# Patient Record
Sex: MALE | Race: ASIAN | NOT HISPANIC OR LATINO | Employment: OTHER | URBAN - METROPOLITAN AREA
[De-identification: names, ages, dates, MRNs, and addresses within clinical notes are randomized per-mention and may not be internally consistent; named-entity substitution may affect disease eponyms.]

---

## 2017-06-30 ENCOUNTER — OFFICE VISIT (OUTPATIENT)
Dept: URGENT CARE | Facility: URGENT CARE | Age: 47
End: 2017-06-30
Payer: COMMERCIAL

## 2017-06-30 ENCOUNTER — RADIANT APPOINTMENT (OUTPATIENT)
Dept: GENERAL RADIOLOGY | Facility: CLINIC | Age: 47
End: 2017-06-30
Attending: NURSE PRACTITIONER
Payer: COMMERCIAL

## 2017-06-30 VITALS
WEIGHT: 128 LBS | SYSTOLIC BLOOD PRESSURE: 110 MMHG | OXYGEN SATURATION: 99 % | HEART RATE: 70 BPM | TEMPERATURE: 98.5 F | DIASTOLIC BLOOD PRESSURE: 70 MMHG

## 2017-06-30 DIAGNOSIS — B02.9 HERPES ZOSTER WITHOUT COMPLICATION: ICD-10-CM

## 2017-06-30 DIAGNOSIS — M79.621 PAIN OF RIGHT UPPER ARM: ICD-10-CM

## 2017-06-30 DIAGNOSIS — M79.621 PAIN OF RIGHT UPPER ARM: Primary | ICD-10-CM

## 2017-06-30 PROCEDURE — 99203 OFFICE O/P NEW LOW 30 MIN: CPT | Performed by: NURSE PRACTITIONER

## 2017-06-30 PROCEDURE — 73060 X-RAY EXAM OF HUMERUS: CPT | Mod: RT

## 2017-06-30 RX ORDER — IBUPROFEN 600 MG/1
600 TABLET, FILM COATED ORAL EVERY 6 HOURS PRN
Qty: 30 TABLET | Refills: 1 | Status: SHIPPED | OUTPATIENT
Start: 2017-06-30 | End: 2019-11-05

## 2017-06-30 RX ORDER — VALACYCLOVIR HYDROCHLORIDE 1 G/1
1000 TABLET, FILM COATED ORAL 3 TIMES DAILY
Qty: 21 TABLET | Refills: 0 | Status: SHIPPED | OUTPATIENT
Start: 2017-06-30 | End: 2017-07-15

## 2017-06-30 NOTE — MR AVS SNAPSHOT
After Visit Summary   6/30/2017    Avtar Hodge    MRN: [id number]           Patient Information     Date Of Birth          1970        Visit Information        Provider Department      6/30/2017 8:40 PM Dariel Kraus APRN [facility] URGENT CARE        Today's Diagnoses     Pain of right upper arm    -  1    Herpes zoster without complication          Care Instructions      Shingles  Shingles is a viral infection caused by the same virus as chicken pox. Anyone who has had chicken pox may get shingles later in life. The virus stays in the body, but remains dormant (asleep). Shingles often occurs in older persons or persons with lowered immunity. But it can affect anyone at any age.  Shingles starts as a tingling patch of skin on one side of the body. Small, painful blisters may then appear. The rash does not spread to the rest of the body.  Exposure to shingles cannot cause shingles. However, it can cause chicken pox in anyone who has not had chicken pox or has not been vaccinated. The contagious period ends when all blisters have crusted over (generally about 2 weeks after the illness begins).  After the blisters heal, the affected skin may be sensitive or painful for months (neuralgia). This often gradually goes away.  A shingles vaccine is available. This can help prevent shingles or make it less painful. It is generally recommended for adults over the age of 60 who have had chicken pox in the past, but who have never had shingles. Adults over 60 who have had neither chicken pox nor shingles can prevent both diseases with the chicken pox vaccine. Ask your healthcare provider about these vaccines.  Home care    Medicines may be prescribed to help relieve pain. Take these medicines as directed. Ask your healthcare provider or pharmacist before using over-the-counter medicines for helping treat pain and itching.    In certain cases, antiviral medicines may be prescribed to  reduce pain, shorten the illness, and prevent neuralgia. Take these medicines as directed.    Compresses made from a solution of cool water mixed with cornstarch or baking soda may help relieve pain and itching.     Gently wash skin daily with soap and water to help prevent infection.  Be certain to rinse off all of the soap, which can be irritating.    Trim fingernails and try not to scratch. Scratching the sores may leave scars.    Stay home from work or school until all blisters have formed a crust and you are no longer contagious.  Follow-up care  Follow up with your healthcare provider or as directed by our staff.  When to seek medical advice    Fever of 100.4 F (38 C) or higher, or as directed by your healthcare provider    Affected skin is on the face or neck and any of the following occur:    Headache    Eye pain    Changes in vision    Sores near the eye    Weakness of facial muscles    Pain, redness, or swelling of a joint    Signs of skin infection: colored drainage from the sores, warmth, increasing redness, or increasing pain  Date Last Reviewed: 9/25/2015 2000-2017 [employer]. [address] [place] [address]. All rights reserved. This information is not intended as a substitute for professional medical care. Always follow your healthcare professional's instructions.                Follow-ups after your visit        Who to contact     If you have questions or need follow up information about today's clinic visit or your schedule please contact [facility] URGENT CARE directly at [phone].  Normal or non-critical lab and imaging results will be communicated to you by MyChart, letter or phone within 4 business days after the clinic has received the results. If you do not hear from us within 7 days, please contact the clinic through MyChart or phone. If you have a critical or abnormal lab result, we will notify you by phone as soon as possible.  Submit refill requests  "through [employer] or call your pharmacy and they will forward the refill request to us. Please allow 3 business days for your refill to be completed.          Additional Information About Your Visit        Revivnhart Information     [employer] lets you send messages to your doctor, view your test results, renew your prescriptions, schedule appointments and more. To sign up, go to [email or web address] . Click on \"Log in\" on the left side of the screen, which will take you to the Welcome page. Then click on \"Sign up Now\" on the right side of the page.     You will be asked to enter the access code listed below, as well as some personal information. Please follow the directions to create your username and password.     Your access code is: [email or web address]  Expires: 2017  9:13 PM     Your access code will  in 90 days. If you need help or a new code, please call your [facility] clinic or [phone].        Care EveryWhere ID     This is your Care EveryWhere ID. This could be used by other organizations to access your [facility] medical records  [id number]        Your Vitals Were     Pulse Temperature Pulse Oximetry             70 98.5  F (36.9  C) (Oral) 99%          Blood Pressure from Last 3 Encounters:   17 110/70    Weight from Last 3 Encounters:   17 128 lb (58.1 kg)                 Today's Medication Changes          These changes are accurate as of: 17  9:13 PM.  If you have any questions, ask your nurse or doctor.               Start taking these medicines.        Dose/Directions    ibuprofen 600 MG tablet   Commonly known as:  ADVIL/MOTRIN   Used for:  Pain of right upper arm, Herpes zoster without complication   Started by:  Dariel Kraus APRN CNP        Dose:  600 mg   Take 1 tablet (600 mg) by mouth every 6 hours as needed for moderate pain   Quantity:  30 tablet   Refills:  1       valACYclovir 1000 mg tablet   Commonly known as:  VALTREX   Used for:  Herpes zoster without " complication   Started by:  Dariel Kraus, WALKER CNP        Dose:  1000 mg   Take 1 tablet (1,000 mg) by mouth 3 times daily   Quantity:  21 tablet   Refills:  0            Where to get your medicines      These medications were sent to [facility] [address] AT [facility] [address], [facility] [address]    Hours:  24-hours Phone:  [phone]     ibuprofen 600 MG tablet    valACYclovir 1000 mg tablet                Primary Care Provider    None Specified       No primary provider on file.        Equal Access to Services     [facility]: Jason Jones, annemarie lorenzo, dionte joya, willow gottlieb . So [facility] [phone].    ATENCIÓN: Si habla español, tiene a doshi disposición servicios gratuitos de asistencia lingüística. [facility] [phone].    We comply with applicable federal civil rights laws and [place] laws. We do not discriminate on the basis of race, color, national origin, age, disability sex, sexual orientation or gender identity.            Thank you!     Thank you for choosing [facility] URGENT CARE  for your care. Our goal is always to provide you with excellent care. Hearing back from our patients is one way we can continue to improve our services. Please take a few minutes to complete the written survey that you may receive in the mail after your visit with us. Thank you!             Your Updated Medication List - Protect others around you: Learn how to safely use, store and throw away your medicines at www.disposemymeds.org.          This list is accurate as of: 6/30/17  9:13 PM.  Always use your most recent med list.                   Brand Name Dispense Instructions for use Diagnosis    ibuprofen 600 MG tablet    ADVIL/MOTRIN    30 tablet    Take 1 tablet (600 mg) by mouth every 6 hours as needed for moderate pain    Pain of right upper arm, Herpes  zoster without complication       valACYclovir 1000 mg tablet    VALTREX    21 tablet    Take 1 tablet (1,000 mg) by mouth 3 times daily    Herpes zoster without complication

## 2017-07-01 NOTE — PROGRESS NOTES
Chief Complaint   Patient presents with     Urgent Care     Musculoskeletal Problem     pt is here for R arm and shoulder pain - started about a week ago        SUBJECTIVE:  Avtar Hodge is a 47 year old male who presents with right shoulder pain which initially started about a week ago after doing some sanding. He works construction. Initially he had some soreness which was very transient. However over the last 2 days he has had severe right shoulder discomfort which has been to the point of awakening. No trauma. He does heavy lifting with regular use of his hands. He is reporting history of a chronic rash of eczema-like presentation. He uses what appears to have been a cortisone cream which has since resolved. Reports history of chickenpox as a child. No fevers. No chills. No shortness of breath. No chest pain.        OBJECTIVE:  /70 (BP Location: Right arm, Cuff Size: Adult Regular)  Pulse 70  Temp 98.5  F (36.9  C) (Oral)  Wt 128 lb (58.1 kg)  SpO2 99%  Invasion middle-age male in no apparent distress. Reported pain of the right deltoid to upper humerus but no tenderness to palpation. Full range of bilateral shoulders throughout including extreme abduction and rotation without any discomfort. Negative Spurling's maneuver. Full range of motion of neck throughout. No palpable tenderness of bilateral shoulders. There was a somewhat que rash on the right anterior chest wall area extending to the axillary area. The posterior upper back and nuchal area had some dry skin presentation consistent with eczema. Otherwise the rest of the skin was essentially normal. Lung sounds were clear to auscultation throughout. Heart was of  regular rhythm and rate.  X-ray was normal    ASSESSMENT/PLAN:    (M79.621) Pain of right upper arm  (primary encounter diagnosis)  Comment: Right upper pain suspicious of a case of shingles. Other differentials including tendinitis were discussed. Treat as below. Monitor very closely.  Follow-up in the coming week with ongoing concerns.  Plan: XR Humerus Right G/E 2 Views, ibuprofen         (ADVIL/MOTRIN) 600 MG tablet            (B02.9) Herpes zoster without complication  Comment: As above  Plan: valACYclovir (VALTREX) 1000 mg tablet,         ibuprofen (ADVIL/MOTRIN) 600 MG tablet            WALKER Posey CNP

## 2017-07-01 NOTE — PATIENT INSTRUCTIONS
Shingles  Shingles is a viral infection caused by the same virus as chicken pox. Anyone who has had chicken pox may get shingles later in life. The virus stays in the body, but remains dormant (asleep). Shingles often occurs in older persons or persons with lowered immunity. But it can affect anyone at any age.  Shingles starts as a tingling patch of skin on one side of the body. Small, painful blisters may then appear. The rash does not spread to the rest of the body.  Exposure to shingles cannot cause shingles. However, it can cause chicken pox in anyone who has not had chicken pox or has not been vaccinated. The contagious period ends when all blisters have crusted over (generally about 2 weeks after the illness begins).  After the blisters heal, the affected skin may be sensitive or painful for months (neuralgia). This often gradually goes away.  A shingles vaccine is available. This can help prevent shingles or make it less painful. It is generally recommended for adults over the age of 60 who have had chicken pox in the past, but who have never had shingles. Adults over 60 who have had neither chicken pox nor shingles can prevent both diseases with the chicken pox vaccine. Ask your healthcare provider about these vaccines.  Home care    Medicines may be prescribed to help relieve pain. Take these medicines as directed. Ask your healthcare provider or pharmacist before using over-the-counter medicines for helping treat pain and itching.    In certain cases, antiviral medicines may be prescribed to reduce pain, shorten the illness, and prevent neuralgia. Take these medicines as directed.    Compresses made from a solution of cool water mixed with cornstarch or baking soda may help relieve pain and itching.     Gently wash skin daily with soap and water to help prevent infection.  Be certain to rinse off all of the soap, which can be irritating.    Trim fingernails and try not to scratch. Scratching the sores  may leave scars.    Stay home from work or school until all blisters have formed a crust and you are no longer contagious.  Follow-up care  Follow up with your healthcare provider or as directed by our staff.  When to seek medical advice    Fever of 100.4 F (38 C) or higher, or as directed by your healthcare provider    Affected skin is on the face or neck and any of the following occur:    Headache    Eye pain    Changes in vision    Sores near the eye    Weakness of facial muscles    Pain, redness, or swelling of a joint    Signs of skin infection: colored drainage from the sores, warmth, increasing redness, or increasing pain  Date Last Reviewed: 9/25/2015 2000-2017 [employer]. [address] [place] [address], [place] [address]. All rights reserved. This information is not intended as a substitute for professional medical care. Always follow your healthcare professional's instructions.

## 2017-07-01 NOTE — NURSING NOTE
Avtar Hodge is a 47 year old male.      Chief Complaint   Patient presents with     Urgent Care     Musculoskeletal Problem     pt is here for R arm and shoulder pain - started about a week ago        Initial /70 (BP Location: Right arm, Cuff Size: Adult Regular)  Pulse 70  Temp 98.5  F (36.9  C) (Oral)  Wt 128 lb (58.1 kg)  SpO2 99% There is no height or weight on file to calculate BMI.  Medication Reconciliation: complete      Questioned patient about current smoking habits.  Pt. currently smokes.  Advised about smoking cessation.      Liya Powers CMA

## 2017-07-15 ENCOUNTER — OFFICE VISIT (OUTPATIENT)
Dept: URGENT CARE | Facility: URGENT CARE | Age: 47
End: 2017-07-15
Payer: COMMERCIAL

## 2017-07-15 VITALS
WEIGHT: 128 LBS | SYSTOLIC BLOOD PRESSURE: 110 MMHG | OXYGEN SATURATION: 98 % | RESPIRATION RATE: 16 BRPM | HEART RATE: 76 BPM | TEMPERATURE: 98.2 F | DIASTOLIC BLOOD PRESSURE: 70 MMHG

## 2017-07-15 DIAGNOSIS — M77.12 LATERAL EPICONDYLITIS OF LEFT ELBOW: Primary | ICD-10-CM

## 2017-07-15 PROCEDURE — 99212 OFFICE O/P EST SF 10 MIN: CPT | Performed by: NURSE PRACTITIONER

## 2017-07-15 NOTE — PROGRESS NOTES
SUBJECTIVE:  Avtar Hodge is a 47 year old male here for eval of left elbow pain for few days. Was seen here 6/30  and was diagnosed with shingles. States  at that time he c/o elbow pain on the right. States right arm better now but , having pain on the left elbow. No injury or trauma to the site. States pain is the same and states this is in no way related to work activity.     OBJECTIVE:  Vital signs as noted above.  Appearance: in no apparent distress and well developed and well nourished.  Elbow exam: Mild tenderness with palpation lateral aspect of left elbow no effusion no step offs. normal exam, no swelling, tenderness, instability; ligaments intact, FROM all hand, wrist, finger joints.  Skin:  Rash on chest  resolved     X-ray: not indicated.    ASSESSMENT:  Epicondylitis     PLAN: Encouraged to wear splint and ice the site. Was requesting to see male provider like last visit. Advised to establish primary care ASAP. Explained this is UC on the weekend and different providers practice here at different times.

## 2017-07-15 NOTE — NURSING NOTE
Avtar Hodge is a 47 year old male.      Chief Complaint   Patient presents with     Urgent Care     Derm Problem     pt is still having R arm pain - was seen already for this and now states that it is going to his other arm.       Initial /70 (BP Location: Right arm, Cuff Size: Adult Regular)  Pulse 76  Temp 98.2  F (36.8  C) (Oral)  Resp 16  Wt 128 lb (58.1 kg)  SpO2 98% There is no height or weight on file to calculate BMI.  Medication Reconciliation: complete      Questioned patient about current smoking habits.  Pt. has never smoked.      Liya Powers CMA

## 2017-07-15 NOTE — MR AVS SNAPSHOT
"              After Visit Summary   7/15/2017    Avtar Hodge    MRN: [id number]           Patient Information     Date Of Birth          1970        Visit Information        Provider Department      7/15/2017 3:40 PM Ebony Santana APRN CNP [facility] URGENT CARE        Today's Diagnoses     Lateral epicondylitis of left elbow    -  1       Follow-ups after your visit        Your next 10 appointments already scheduled     Jul 17, 2017  4:00 PM CDT   Office Visit with Nasra Peraza NP   [facility]    [address] [facility] [address] [phone]           Bring a current list of meds and any records pertaining to this visit.  For Physicals, please bring immunization records and any forms needing to be filled out.  Please arrive 10 minutes early to complete paperwork.              Who to contact     If you have questions or need follow up information about today's clinic visit or your schedule please contact [facility] URGENT CARE directly at [phone].  Normal or non-critical lab and imaging results will be communicated to you by Instagramhart, letter or phone within 4 business days after the clinic has received the results. If you do not hear from us within 7 days, please contact the clinic through [employer] or phone. If you have a critical or abnormal lab result, we will notify you by phone as soon as possible.  Submit refill requests through [email or web address] or call your pharmacy and they will forward the refill request to us. Please allow 3 business days for your refill to be completed.          Additional Information About Your Visit        Instagramhart Information     [email or web address] lets you send messages to your doctor, view your test results, renew your prescriptions, schedule appointments and more. To sign up, go to [email or web address] . Click on \"Log in\" on the left side of the screen, which will take you to the Welcome page. Then click on " "\"Sign up Now\" on the right side of the page.     You will be asked to enter the access code listed below, as well as some personal information. Please follow the directions to create your username and password.     Your access code is: [email or web address]  Expires: 2017  9:13 PM     Your access code will  in 90 days. If you need help or a new code, please call your [facility] clinic or [phone].        Care EveryWhere ID     This is your Care EveryWhere ID. This could be used by other organizations to access your [facility] medical records  [id number]        Your Vitals Were     Pulse Temperature Respirations Pulse Oximetry          76 98.2  F (36.8  C) (Oral) 16 98%         Blood Pressure from Last 3 Encounters:   07/15/17 110/70   17 110/70    Weight from Last 3 Encounters:   07/15/17 128 lb (58.1 kg)   17 128 lb (58.1 kg)              We Performed the Following     ARMBAND FOR TENNIS ELBOW        Primary Care Provider    None Specified       No primary provider on file.        Equal Access to Services     [facility]: Hadii sidney Jones, waaxda maura, qaybta kaalray joya, willow gottlieb . So [facility] [phone].    ATENCIÓN: Si habla español, tiene a doshi disposición servicios gratuitos de asistencia lingüística. Llame al [phone].    We comply with applicable federal civil rights laws and [place] laws. We do not discriminate on the basis of race, color, national origin, age, disability sex, sexual orientation or gender identity.            Thank you!     Thank you for choosing [facility] URGENT CARE  for your care. Our goal is always to provide you with excellent care. Hearing back from our patients is one way we can continue to improve our services. Please take a few minutes to complete the written survey that you may receive in the mail after your visit with us. Thank you!             Your Updated Medication List - Protect others around " you: Learn how to safely use, store and throw away your medicines at www.disposemymeds.org.          This list is accurate as of: 7/15/17  4:18 PM.  Always use your most recent med list.                   Brand Name Dispense Instructions for use Diagnosis    ibuprofen 600 MG tablet    ADVIL/MOTRIN    30 tablet    Take 1 tablet (600 mg) by mouth every 6 hours as needed for moderate pain    Pain of right upper arm, Herpes zoster without complication

## 2017-07-17 ENCOUNTER — OFFICE VISIT (OUTPATIENT)
Dept: FAMILY MEDICINE | Facility: CLINIC | Age: 47
End: 2017-07-17
Payer: COMMERCIAL

## 2017-07-17 VITALS
OXYGEN SATURATION: 97 % | HEART RATE: 74 BPM | DIASTOLIC BLOOD PRESSURE: 70 MMHG | HEIGHT: 62 IN | BODY MASS INDEX: 24.42 KG/M2 | WEIGHT: 132.7 LBS | SYSTOLIC BLOOD PRESSURE: 118 MMHG | TEMPERATURE: 98.5 F

## 2017-07-17 DIAGNOSIS — R21 RASH: ICD-10-CM

## 2017-07-17 DIAGNOSIS — Z00.00 ENCOUNTER FOR ROUTINE ADULT HEALTH EXAMINATION WITHOUT ABNORMAL FINDINGS: ICD-10-CM

## 2017-07-17 DIAGNOSIS — M79.621 PAIN OF RIGHT UPPER ARM: Primary | ICD-10-CM

## 2017-07-17 PROCEDURE — 80053 COMPREHEN METABOLIC PANEL: CPT | Performed by: NURSE PRACTITIONER

## 2017-07-17 PROCEDURE — 36415 COLL VENOUS BLD VENIPUNCTURE: CPT | Performed by: NURSE PRACTITIONER

## 2017-07-17 PROCEDURE — 99213 OFFICE O/P EST LOW 20 MIN: CPT | Performed by: NURSE PRACTITIONER

## 2017-07-17 PROCEDURE — 86140 C-REACTIVE PROTEIN: CPT | Performed by: NURSE PRACTITIONER

## 2017-07-17 PROCEDURE — 80061 LIPID PANEL: CPT | Performed by: NURSE PRACTITIONER

## 2017-07-17 RX ORDER — PREDNISONE 20 MG/1
40 TABLET ORAL DAILY
Qty: 10 TABLET | Refills: 0 | Status: SHIPPED | OUTPATIENT
Start: 2017-07-17 | End: 2017-07-22

## 2017-07-17 RX ORDER — TRIAMCINOLONE ACETONIDE 1 MG/G
CREAM TOPICAL
Qty: 30 G | Refills: 3 | Status: SHIPPED | OUTPATIENT
Start: 2017-07-17 | End: 2017-08-21

## 2017-07-17 RX ORDER — KETOCONAZOLE 20 MG/G
CREAM TOPICAL 2 TIMES DAILY
Qty: 30 G | Refills: 1 | Status: SHIPPED | OUTPATIENT
Start: 2017-07-17 | End: 2017-08-21

## 2017-07-17 NOTE — NURSING NOTE
"Chief Complaint   Patient presents with     Musculoskeletal Problem       Initial /70 (BP Location: Right arm, Patient Position: Chair, Cuff Size: Adult Regular)  Pulse 74  Temp 98.5  F (36.9  C)  Ht 5' 2\" (1.575 m)  Wt 132 lb 11.2 oz (60.2 kg)  SpO2 97%  BMI 24.27 kg/m2 Estimated body mass index is 24.27 kg/(m^2) as calculated from the following:    Height as of this encounter: 5' 2\" (1.575 m).    Weight as of this encounter: 132 lb 11.2 oz (60.2 kg).  Medication Reconciliation: complete       KA    "

## 2017-07-17 NOTE — PROGRESS NOTES
"  SUBJECTIVE:                                                    Avtar Hodge is a 47 year old male who presents to clinic today for the following health issues:    Here to follow up on bilateral elbow and shoulder pain for the past few weeks. Has been to the urgent care twice in the past few weeks. Is very concerned about his overall health. Drinking alcohol on the weekends but also has had several episodes of excessive drinking and may have hurt his neck. Concerned about his low energy as well. 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 Numbness and tingling in bilateral upper extremities. No pain with palpation. No limitations on range of motion.     Rash on left upper chest and given antiviral as another provider thought it was shingles. Rash still persists. Has a rash on upper back and in the groin and lower abdomen. Has creams that he has used in the past that have been helpful.           Problem list and histories reviewed & adjusted, as indicated.  Additional history: none    There is no problem list on file for this patient.    No past surgical history on file.    Social History   Substance Use Topics     Smoking status: Current Every Day Smoker     Smokeless tobacco: Never Used     Alcohol use Yes     No family history on file.        Reviewed and updated as needed this visit by clinical staff  Tobacco  Allergies  Meds       Reviewed and updated as needed this visit by Provider         ROS:  Constitutional, HEENT, cardiovascular, pulmonary, gi and gu systems are negative, except as otherwise noted.    OBJECTIVE:     /70 (BP Location: Right arm, Patient Position: Chair, Cuff Size: Adult Regular)  Pulse 74  Temp 98.5  F (36.9  C)  Ht 5' 2\" (1.575 m)  Wt 132 lb 11.2 oz (60.2 kg)  SpO2 97%  BMI 24.27 kg/m2  Body mass index is 24.27 kg/(m^2).  GENERAL: healthy, alert and no distress  EYES: Eyes grossly normal to inspection, PERRL and conjunctivae and sclerae normal  HENT: " ear canals and TM's normal, nose and mouth without ulcers or lesions  NECK: no adenopathy, no asymmetry, masses, or scars and thyroid normal to palpation  RESP: lungs clear to auscultation - no rales, rhonchi or wheezes  CV: regular rate and rhythm, normal S1 S2, no S3 or S4, no murmur, click or rub, no peripheral edema and peripheral pulses strong  MS: normal range of motion and pain at the elbows  SKIN: rash on upper back that appears to be pityriasis, fungal appearing rash on lower abdomen and groin.   NEURO: Normal strength and tone, mentation intact and speech normal  PSYCH: mentation appears normal, affect normal/bright    Diagnostic Test Results:  Results for orders placed or performed in visit on 06/30/17   XR Humerus Right G/E 2 Views    Narrative    RIGHT HUMERUS TWO OR MORE VIEWS   6/30/2017 9:09 PM     HISTORY: Rule out pathology. Pain in right upper arm.    COMPARISON: None.      Impression    IMPRESSION: Normal.    AMBER MORALES MD       ASSESSMENT/PLAN:             1. Encounter for routine adult health examination without abnormal findings  Fasting labs drawn today.   - Lipid panel reflex to direct LDL    2. Pain of right upper arm  Prednisone for five days. May need referral to orthopedics if no improvement with conservative treatment.   - Comprehensive metabolic panel  - CRP inflammation  - predniSONE (DELTASONE) 20 MG tablet; Take 2 tablets (40 mg) by mouth daily for 5 days  Dispense: 10 tablet; Refill: 0    3. Rash  Refilled creams that have been helpful in the past.   - triamcinolone (KENALOG) 0.1 % cream; Apply sparingly to affected area three times daily for 14 days.  Dispense: 30 g; Refill: 3  - ketoconazole (NIZORAL) 2 % cream; Apply topically 2 times daily  Dispense: 30 g; Refill: 1    Follow up once yearly for exam.     Nasra Peraza, NP  [facility]

## 2017-07-17 NOTE — LETTER
[facility]   [address] [place] [address]  [phone]      July 19, 2017    Avtar Hodge  [address] [facility] [address]    Dear Avtar,    Enclosed is a copy of your labs that we discussed.  Any questions please call the clinic at [phone].      Sincerely,      Nasra Peraza NP    Results for orders placed or performed in visit on 07/17/17   Comprehensive metabolic panel   Result Value Ref Range    Sodium 143 133 - 144 mmol/L    Potassium 4.2 3.4 - 5.3 mmol/L    Chloride 108 94 - 109 mmol/L    Carbon Dioxide 27 20 - 32 mmol/L    Anion Gap 8 3 - 14 mmol/L    Glucose 117 (H) 70 - 99 mg/dL    Urea Nitrogen 17 7 - 30 mg/dL    Creatinine 0.92 0.66 - 1.25 mg/dL    GFR Estimate 89 >60 mL/min/1.7m2    GFR Estimate If Black >90   GFR Calc   >60 mL/min/1.7m2    Calcium 8.8 8.5 - 10.1 mg/dL    Bilirubin Total 0.3 0.2 - 1.3 mg/dL    Albumin 3.8 3.4 - 5.0 g/dL    Protein Total 7.5 6.8 - 8.8 g/dL    Alkaline Phosphatase 72 40 - 150 U/L    ALT 38 0 - 70 U/L    AST 17 0 - 45 U/L   CRP inflammation   Result Value Ref Range    CRP Inflammation <2.9 0.0 - 8.0 mg/L   Lipid panel reflex to direct LDL   Result Value Ref Range    Cholesterol 200 (H) <200 mg/dL    Triglycerides 321 (H) <150 mg/dL    HDL Cholesterol 37 (L) >39 mg/dL    LDL Cholesterol Calculated 99 <100 mg/dL    Non HDL Cholesterol 163 (H) <130 mg/dL

## 2017-07-17 NOTE — MR AVS SNAPSHOT
"              After Visit Summary   2017    Avtar Hodge    MRN: [id number]           Patient Information     Date Of Birth          1970        Visit Information        Provider Department      2017 4:00 PM Nasra Peraza NP [facility]        Today's Diagnoses     Pain of right upper arm    -  1    Encounter for routine adult health examination without abnormal findings        Rash           Follow-ups after your visit        Who to contact     If you have questions or need follow up information about today's clinic visit or your schedule please contact [facility] directly at [phone].  Normal or non-critical lab and imaging results will be communicated to you by [employer], letter or phone within 4 business days after the clinic has received the results. If you do not hear from us within 7 days, please contact the clinic through [employer] or phone. If you have a critical or abnormal lab result, we will notify you by phone as soon as possible.  Submit refill requests through [email or web address] or call your pharmacy and they will forward the refill request to us. Please allow 3 business days for your refill to be completed.          Additional Information About Your Visit        MyChart Information     [email or web address] lets you send messages to your doctor, view your test results, renew your prescriptions, schedule appointments and more. To sign up, go to [email or web address] . Click on \"Log in\" on the left side of the screen, which will take you to the Welcome page. Then click on \"Sign up Now\" on the right side of the page.     You will be asked to enter the access code listed below, as well as some personal information. Please follow the directions to create your username and password.     Your access code is: [email or web address]  Expires: 2017  9:13 PM     Your access code will  in 90 days. If you need help or a new code, please call your [facility] or [phone].      " "  Care EveryWhere ID     This is your Care EveryWhere ID. This could be used by other organizations to access your [facility] medical records  [id number]        Your Vitals Were     Pulse Temperature Height Pulse Oximetry BMI (Body Mass Index)       74 98.5  F (36.9  C) 5' 2\" (1.575 m) 97% 24.27 kg/m2        Blood Pressure from Last 3 Encounters:   07/17/17 118/70   07/15/17 110/70   06/30/17 110/70    Weight from Last 3 Encounters:   07/17/17 132 lb 11.2 oz (60.2 kg)   07/15/17 128 lb (58.1 kg)   06/30/17 128 lb (58.1 kg)              We Performed the Following     Comprehensive metabolic panel     CRP inflammation     Lipid panel reflex to direct LDL          Today's Medication Changes          These changes are accurate as of: 7/17/17  4:51 PM.  If you have any questions, ask your nurse or doctor.               Start taking these medicines.        Dose/Directions    ketoconazole 2 % cream   Commonly known as:  NIZORAL   Used for:  Rash   Started by:  Nasra Peraza NP        Apply topically 2 times daily   Quantity:  30 g   Refills:  1       predniSONE 20 MG tablet   Commonly known as:  DELTASONE   Used for:  Pain of right upper arm   Started by:  Nasra Peraza NP        Dose:  40 mg   Take 2 tablets (40 mg) by mouth daily for 5 days   Quantity:  10 tablet   Refills:  0       triamcinolone 0.1 % cream   Commonly known as:  KENALOG   Used for:  Rash   Started by:  Nasra Peraza NP        Apply sparingly to affected area three times daily for 14 days.   Quantity:  30 g   Refills:  3            Where to get your medicines      These medications were sent to [facility] [id number] [facility] [address], [facility] [address]    Hours:  24-hours Phone:  [phone]     ketoconazole 2 % cream    predniSONE 20 MG tablet    triamcinolone 0.1 % cream                Primary Care Provider Office Phone # Fax #    Nasra Peraza [phone] " [phone]       [facility] [address] [address]  [facility] [address]        Equal Access to Services     ORAL OSORIO : Hadii aad ku hadesmersamuel Sodinoali, waaxda luqadaha, qaybta kaalmada [place], willow bennyin hayaateri hortonrod garcia chery white. So [facility] [phone].    ATENCIÓN: Si habla español, tiene a doshi disposición servicios gratuitos de asistencia lingüística. Conchis al [phone].    We comply with applicable federal civil rights laws and [place] laws. We do not discriminate on the basis of race, color, national origin, age, disability sex, sexual orientation or gender identity.            Thank you!     Thank you for choosing [facility]  for your care. Our goal is always to provide you with excellent care. Hearing back from our patients is one way we can continue to improve our services. Please take a few minutes to complete the written survey that you may receive in the mail after your visit with us. Thank you!             Your Updated Medication List - Protect others around you: Learn how to safely use, store and throw away your medicines at www.disposemymeds.org.          This list is accurate as of: 7/17/17  4:51 PM.  Always use your most recent med list.                   Brand Name Dispense Instructions for use Diagnosis    ibuprofen 600 MG tablet    ADVIL/MOTRIN    30 tablet    Take 1 tablet (600 mg) by mouth every 6 hours as needed for moderate pain    Pain of right upper arm, Herpes zoster without complication       ketoconazole 2 % cream    NIZORAL    30 g    Apply topically 2 times daily    Rash       predniSONE 20 MG tablet    DELTASONE    10 tablet    Take 2 tablets (40 mg) by mouth daily for 5 days    Pain of right upper arm       triamcinolone 0.1 % cream    KENALOG    30 g    Apply sparingly to affected area three times daily for 14 days.    Rash

## 2017-07-18 LAB
ALBUMIN SERPL-MCNC: 3.8 G/DL (ref 3.4–5)
ALP SERPL-CCNC: 72 U/L (ref 40–150)
ALT SERPL W P-5'-P-CCNC: 38 U/L (ref 0–70)
ANION GAP SERPL CALCULATED.3IONS-SCNC: 8 MMOL/L (ref 3–14)
AST SERPL W P-5'-P-CCNC: 17 U/L (ref 0–45)
BILIRUB SERPL-MCNC: 0.3 MG/DL (ref 0.2–1.3)
BUN SERPL-MCNC: 17 MG/DL (ref 7–30)
CALCIUM SERPL-MCNC: 8.8 MG/DL (ref 8.5–10.1)
CHLORIDE SERPL-SCNC: 108 MMOL/L (ref 94–109)
CHOLEST SERPL-MCNC: 200 MG/DL
CO2 SERPL-SCNC: 27 MMOL/L (ref 20–32)
CREAT SERPL-MCNC: 0.92 MG/DL (ref 0.66–1.25)
CRP SERPL-MCNC: <2.9 MG/L (ref 0–8)
GFR SERPL CREATININE-BSD FRML MDRD: 89 ML/MIN/1.7M2
GLUCOSE SERPL-MCNC: 117 MG/DL (ref 70–99)
HDLC SERPL-MCNC: 37 MG/DL
LDLC SERPL CALC-MCNC: 99 MG/DL
NONHDLC SERPL-MCNC: 163 MG/DL
POTASSIUM SERPL-SCNC: 4.2 MMOL/L (ref 3.4–5.3)
PROT SERPL-MCNC: 7.5 G/DL (ref 6.8–8.8)
SODIUM SERPL-SCNC: 143 MMOL/L (ref 133–144)
TRIGL SERPL-MCNC: 321 MG/DL

## 2017-08-21 ENCOUNTER — OFFICE VISIT (OUTPATIENT)
Dept: FAMILY MEDICINE | Facility: CLINIC | Age: 47
End: 2017-08-21
Payer: COMMERCIAL

## 2017-08-21 VITALS
DIASTOLIC BLOOD PRESSURE: 66 MMHG | OXYGEN SATURATION: 97 % | HEIGHT: 62 IN | HEART RATE: 63 BPM | TEMPERATURE: 98.5 F | BODY MASS INDEX: 23.92 KG/M2 | SYSTOLIC BLOOD PRESSURE: 114 MMHG | RESPIRATION RATE: 16 BRPM | WEIGHT: 130 LBS

## 2017-08-21 DIAGNOSIS — R21 RASH: ICD-10-CM

## 2017-08-21 DIAGNOSIS — N52.9 ERECTILE DYSFUNCTION, UNSPECIFIED ERECTILE DYSFUNCTION TYPE: ICD-10-CM

## 2017-08-21 DIAGNOSIS — Z00.01 ENCOUNTER FOR ROUTINE ADULT MEDICAL EXAM WITH ABNORMAL FINDINGS: Primary | ICD-10-CM

## 2017-08-21 PROCEDURE — 99396 PREV VISIT EST AGE 40-64: CPT | Performed by: NURSE PRACTITIONER

## 2017-08-21 RX ORDER — SILDENAFIL 100 MG/1
50-100 TABLET, FILM COATED ORAL DAILY PRN
Qty: 6 TABLET | Refills: 1 | Status: SHIPPED | OUTPATIENT
Start: 2017-08-21 | End: 2022-10-25

## 2017-08-21 RX ORDER — TRIAMCINOLONE ACETONIDE 1 MG/G
CREAM TOPICAL
Qty: 85.2 G | Refills: 3 | Status: SHIPPED | OUTPATIENT
Start: 2017-08-21 | End: 2022-10-25

## 2017-08-21 RX ORDER — KETOCONAZOLE 20 MG/G
CREAM TOPICAL 2 TIMES DAILY
Qty: 60 G | Refills: 11 | Status: SHIPPED | OUTPATIENT
Start: 2017-08-21 | End: 2022-10-25

## 2017-08-21 NOTE — PROGRESS NOTES
SUBJECTIVE:   CC: Avtar Hodge is an 47 year old male who presents for preventative health visit.     Physical   Annual:     Getting at least 3 servings of Calcium per day::  Yes    Bi-annual eye exam::  NO    Dental care twice a year::  NO    Sleep apnea or symptoms of sleep apnea::  None    Diet::  Regular (no restrictions)    Frequency of exercise::  None    Taking medications regularly::  Yes    Medication side effects::  None    Additional concerns today::  No    Here for a complete physical exam and to get refills on several different creams he uses for eczema and fungal infection. Has been seen by dermatology in the past. Interested in discussing erectile dysfunction as well.  and having difficulty maintaining an erection. Has taken Viagra in the past and has been helpful.             Today's PHQ-2 Score:   PHQ-2 ( 1999 Pfizer) 8/21/2017   Q1: Little interest or pleasure in doing things 0   Q2: Feeling down, depressed or hopeless 0   PHQ-2 Score 0   Q1: Little interest or pleasure in doing things Not at all   Q2: Feeling down, depressed or hopeless Not at all   PHQ-2 Score 0       Abuse: Current or Past(Physical, Sexual or Emotional)- No  Do you feel safe in your environment - Yes    Social History   Substance Use Topics     Smoking status: Current Every Day Smoker     Smokeless tobacco: Never Used     Alcohol use Yes     The patient does not drink >3 drinks per day nor >7 drinks per week.    Last PSA: No results found for: PSA    Reviewed orders with patient. Reviewed health maintenance and updated orders accordingly - Yes      Reviewed and updated as needed this visit by clinical staff  Tobacco  Allergies  Meds  Problems  Med Hx  Surg Hx  Fam Hx  Soc Hx          Reviewed and updated as needed this visit by Provider  Allergies  Meds  Problems  Med Hx  Surg Hx  Fam Hx            ROS:  C: NEGATIVE for fever, chills, change in weight  I: NEGATIVE for worrisome rashes, moles or lesions  E:  "NEGATIVE for vision changes or irritation  ENT: NEGATIVE for ear, mouth and throat problems  R: NEGATIVE for significant cough or SOB  CV: NEGATIVE for chest pain, palpitations or peripheral edema  GI: NEGATIVE for nausea, abdominal pain, heartburn, or change in bowel habits   male: negative for dysuria, hematuria, decreased urinary stream, erectile dysfunction, urethral discharge  M: NEGATIVE for significant arthralgias or myalgia  N: NEGATIVE for weakness, dizziness or paresthesias  P: NEGATIVE for changes in mood or affect    OBJECTIVE:   /66 (BP Location: Right arm, Patient Position: Chair, Cuff Size: Adult Regular)  Pulse 63  Temp 98.5  F (36.9  C) (Oral)  Resp 16  Ht 5' 2\" (1.575 m)  Wt 130 lb (59 kg)  SpO2 97%  BMI 23.78 kg/m2    EXAM:  GENERAL: healthy, alert and no distress  EYES: Eyes grossly normal to inspection, PERRL and conjunctivae and sclerae normal  HENT: ear canals and TM's normal, nose and mouth without ulcers or lesions  NECK: no adenopathy, no asymmetry, masses, or scars and thyroid normal to palpation  RESP: lungs clear to auscultation - no rales, rhonchi or wheezes  CV: regular rate and rhythm, normal S1 S2, no S3 or S4, no murmur, click or rub, no peripheral edema and peripheral pulses strong  ABDOMEN: soft, nontender, no hepatosplenomegaly, no masses and bowel sounds normal  MS: no gross musculoskeletal defects noted, no edema  SKIN: erythematous patches on his upper back with centralized clearing.   BACK: no CVA tenderness, no paralumbar tenderness  PSYCH: mentation appears normal, affect normal/bright    ASSESSMENT/PLAN:   1. Encounter for routine adult medical exam with abnormal findings  Normal examination with no need for fasting labs.     2. Rash  Refilled previously prescribed creams for his rashes. Encouraged to apply sparingly.   - triamcinolone (KENALOG) 0.1 % cream; Apply sparingly to affected area three times daily for 14 days.  Dispense: 85.2 g; Refill: 3  - " "ketoconazole (NIZORAL) 2 % cream; Apply topically 2 times daily  Dispense: 60 g; Refill: 11    3. Erectile dysfunction, unspecified erectile dysfunction type  Viagra for ED and have advised cutting tablet in half to see if he gets desired result. Advised to take while lying down.   - sildenafil (VIAGRA) 100 MG cap/tab; Take 0.5-1 tablets ( mg) by mouth daily as needed for erectile dysfunction Take 30 min to 4 hours before intercourse.  Never use with nitroglycerin, terazosin or doxazosin.  Dispense: 6 tablet; Refill: 1    COUNSELING:   Reviewed preventive health counseling, as reflected in patient instructions       Regular exercise       Healthy diet/nutrition         reports that he has been smoking.  He has never used smokeless tobacco.  Tobacco Cessation Action Plan: Information offered: Patient not interested at this time  Estimated body mass index is 23.78 kg/(m^2) as calculated from the following:    Height as of this encounter: 5' 2\" (1.575 m).    Weight as of this encounter: 130 lb (59 kg).         Counseling Resources:  ATP IV Guidelines  Pooled Cohorts Equation Calculator  FRAX Risk Assessment  ICSI Preventive Guidelines  Dietary Guidelines for Americans, 2010  [employer]'s MyPlate  ASA Prophylaxis  Lung CA Screening    Nasra Peraza NP  [facility] for HPI/ROS submitted by the patient on 8/21/2017   PHQ-2 Score: 0    "

## 2017-08-21 NOTE — MR AVS SNAPSHOT
After Visit Summary   8/21/2017    Avtar Hodge    MRN: [id number]           Patient Information     Date Of Birth          1970        Visit Information        Provider Department      8/21/2017 7:00 AM Nasra Peraza NP [facility]        Today's Diagnoses     Encounter for routine adult medical exam with abnormal findings    -  1    Rash        Erectile dysfunction, unspecified erectile dysfunction type          Care Instructions      Preventive Health Recommendations  Male Ages 40 to 49    Yearly exam:             See your health care provider every year in order to  o   Review health changes.   o   Discuss preventive care.    o   Review your medicines if your doctor has prescribed any.    You should be tested each year for STDs (sexually transmitted diseases) if you re at risk.     Have a cholesterol test every 5 years.     Have a colonoscopy (test for colon cancer) if someone in your family has had colon cancer or polyps before age 50.     After age 45, have a diabetes test (fasting glucose). If you are at risk for diabetes, you should have this test every 3 years.      Talk with your health care provider about whether or not a prostate cancer screening test (PSA) is right for you.    Shots: Get a flu shot each year. Get a tetanus shot every 10 years.     Nutrition:    Eat at least 5 servings of fruits and vegetables daily.     Eat whole-grain bread, whole-wheat pasta and brown rice instead of white grains and rice.     Talk to your provider about Calcium and Vitamin D.     Lifestyle    Exercise for at least 150 minutes a week (30 minutes a day, 5 days a week). This will help you control your weight and prevent disease.     Limit alcohol to one drink per day.     No smoking.     Wear sunscreen to prevent skin cancer.     See your dentist every six months for an exam and cleaning.              Follow-ups after your visit        Who to contact     If you have questions or need  "follow up information about today's clinic visit or your schedule please contact [facility] directly at [phone].  Normal or non-critical lab and imaging results will be communicated to you by [facility] [facility], letter or phone within 4 business days after the clinic has received the results. If you do not hear from us within 7 days, please contact the clinic through [employer] or phone. If you have a critical or abnormal lab result, we will notify you by phone as soon as possible.  Submit refill requests through [employer] or call your pharmacy and they will forward the refill request to us. Please allow 3 business days for your refill to be completed.          Additional Information About Your Visit        [facility] Information     [employer] lets you send messages to your doctor, view your test results, renew your prescriptions, schedule appointments and more. To sign up, go to [email or web address] . Click on \"Log in\" on the left side of the screen, which will take you to the Welcome page. Then click on \"Sign up Now\" on the right side of the page.     You will be asked to enter the access code listed below, as well as some personal information. Please follow the directions to create your username and password.     Your access code is: [email or web address]  Expires: 2017  9:13 PM     Your access code will  in 90 days. If you need help or a new code, please call your [facility] clinic or [phone].        Care EveryWhere ID     This is your Care EveryWhere ID. This could be used by other organizations to access your [facility] medical records  [id number]        Your Vitals Were     Pulse Temperature Respirations Height Pulse Oximetry BMI (Body Mass Index)    63 98.5  F (36.9  C) (Oral) 16 5' 2\" (1.575 m) 97% 23.78 kg/m2       Blood Pressure from Last 3 Encounters:   17 114/66   17 118/70   07/15/17 110/70    Weight from Last 3 Encounters:   17 130 lb (59 kg)   17 132 lb 11.2 oz (60.2 " kg)   07/15/17 128 lb (58.1 kg)              Today, you had the following     No orders found for display         Today's Medication Changes          These changes are accurate as of: 8/21/17  7:24 AM.  If you have any questions, ask your nurse or doctor.               Start taking these medicines.        Dose/Directions    sildenafil 100 MG cap/tab   Commonly known as:  VIAGRA   Used for:  Erectile dysfunction, unspecified erectile dysfunction type   Started by:  Nasra Peraza NP        Dose:   mg   Take 0.5-1 tablets ( mg) by mouth daily as needed for erectile dysfunction Take 30 min to 4 hours before intercourse.  Never use with nitroglycerin, terazosin or doxazosin.   Quantity:  6 tablet   Refills:  1            Where to get your medicines      These medications were sent to [facility] [address] [place] [address] AT [address] [address] [address] [address], [facility] [address]    Hours:  24-hours Phone:  [phone]     ketoconazole 2 % cream    triamcinolone 0.1 % cream         Some of these will need a paper prescription and others can be bought over the counter.  Ask your nurse if you have questions.     Bring a paper prescription for each of these medications     sildenafil 100 MG cap/tab                Primary Care Provider Office Phone # Fax #    Nasra Peraza [phone]       [address] [address]        Equal Access to Services     ORAL OSORIO AH: Hadii sidney ku hadasho Soomaali, waaxda luqadaha, qaybta kaalmada adeegyada, waxay simi white. So [facility] [phone].    ATENCIÓN: Si habla español, tiene a doshi disposición servicios gratuitos de asistencia lingüística. Llame al [phone].    We comply with applicable federal civil rights laws and [place] laws. We do not discriminate on the basis of race, color, national origin, age, disability sex, sexual orientation or gender  identity.            Thank you!     Thank you for choosing [facility]  for your care. Our goal is always to provide you with excellent care. Hearing back from our patients is one way we can continue to improve our services. Please take a few minutes to complete the written survey that you may receive in the mail after your visit with us. Thank you!             Your Updated Medication List - Protect others around you: Learn how to safely use, store and throw away your medicines at www.disposemymeds.org.          This list is accurate as of: 8/21/17  7:24 AM.  Always use your most recent med list.                   Brand Name Dispense Instructions for use Diagnosis    ibuprofen 600 MG tablet    ADVIL/MOTRIN    30 tablet    Take 1 tablet (600 mg) by mouth every 6 hours as needed for moderate pain    Pain of right upper arm, Herpes zoster without complication       ketoconazole 2 % cream    NIZORAL    60 g    Apply topically 2 times daily    Rash       sildenafil 100 MG cap/tab    VIAGRA    6 tablet    Take 0.5-1 tablets ( mg) by mouth daily as needed for erectile dysfunction Take 30 min to 4 hours before intercourse.  Never use with nitroglycerin, terazosin or doxazosin.    Erectile dysfunction, unspecified erectile dysfunction type       triamcinolone 0.1 % cream    KENALOG    85.2 g    Apply sparingly to affected area three times daily for 14 days.    Rash

## 2017-08-21 NOTE — NURSING NOTE
"Chief Complaint   Patient presents with     Physical       Initial /66 (BP Location: Right arm, Patient Position: Chair, Cuff Size: Adult Regular)  Pulse 63  Temp 98.5  F (36.9  C) (Oral)  Resp 16  Ht 5' 2\" (1.575 m)  Wt 130 lb (59 kg)  SpO2 97%  BMI 23.78 kg/m2 Estimated body mass index is 23.78 kg/(m^2) as calculated from the following:    Height as of this encounter: 5' 2\" (1.575 m).    Weight as of this encounter: 130 lb (59 kg).  Medication Reconciliation: complete     Georgi Wilson CMA      "

## 2017-09-22 ENCOUNTER — OFFICE VISIT (OUTPATIENT)
Dept: FAMILY MEDICINE | Facility: CLINIC | Age: 47
End: 2017-09-22
Payer: COMMERCIAL

## 2017-09-22 VITALS
HEART RATE: 66 BPM | HEIGHT: 62 IN | OXYGEN SATURATION: 97 % | DIASTOLIC BLOOD PRESSURE: 64 MMHG | SYSTOLIC BLOOD PRESSURE: 102 MMHG | TEMPERATURE: 98.3 F | BODY MASS INDEX: 24.61 KG/M2 | WEIGHT: 133.7 LBS

## 2017-09-22 DIAGNOSIS — B49 FUNGAL INFECTION: ICD-10-CM

## 2017-09-22 DIAGNOSIS — R21 RASH AND NONSPECIFIC SKIN ERUPTION: Primary | ICD-10-CM

## 2017-09-22 LAB
KOH PREP SPEC: ABNORMAL
SPECIMEN SOURCE: ABNORMAL

## 2017-09-22 PROCEDURE — 87220 TISSUE EXAM FOR FUNGI: CPT | Performed by: NURSE PRACTITIONER

## 2017-09-22 PROCEDURE — 99213 OFFICE O/P EST LOW 20 MIN: CPT | Performed by: NURSE PRACTITIONER

## 2017-09-22 RX ORDER — TERBINAFINE HYDROCHLORIDE 250 MG/1
250 TABLET ORAL DAILY
Qty: 14 TABLET | Refills: 0 | Status: SHIPPED | OUTPATIENT
Start: 2017-09-22 | End: 2022-10-25

## 2017-09-22 NOTE — PROGRESS NOTES
"  SUBJECTIVE:   Avtar Hodge is a 47 year old male who presents to clinic today for the following health issues:      Rash  Onset: 2 years ago    Description:   Location: Neck and chest right side  Character: round  Itching (Pruritis): YES    Progression of Symptoms:  worsening    Accompanying Signs & Symptoms:  Fever: no   Body aches or joint pain: no   Sore throat symptoms: no   Recent cold symptoms: no     History:   Previous similar rash: YES    Precipitating factors:   Exposure to similar rash: no   New exposures: None   Recent travel: no     Alleviating factors:  Cream given prior is not working any longer     Therapies Tried and outcome: lamisil and triamcinolone. No relief.   Carl on upepr back and right side of chest that is pruritic and red. Raised border. Present for the past three years and will improve some and then return. Non tender.         Problem list and histories reviewed & adjusted, as indicated.  Additional history: none    There is no problem list on file for this patient.    History reviewed. No pertinent surgical history.    Social History   Substance Use Topics     Smoking status: Current Every Day Smoker     Smokeless tobacco: Never Used     Alcohol use Yes     History reviewed. No pertinent family history.          Reviewed and updated as needed this visit by clinical staffTobacco  Allergies  Meds  Problems  Med Hx  Surg Hx  Fam Hx  Soc Hx        Reviewed and updated as needed this visit by Provider  Allergies  Meds  Problems  Med Hx  Surg Hx  Fam Hx         ROS:  Constitutional, HEENT, cardiovascular, pulmonary, gi and gu systems are negative, except as otherwise noted.      OBJECTIVE:   /64 (BP Location: Right arm, Patient Position: Sitting, Cuff Size: Adult Regular)  Pulse 66  Temp 98.3  F (36.8  C) (Oral)  Ht 5' 2\" (1.575 m)  Wt 133 lb 11.2 oz (60.6 kg)  SpO2 97%  BMI 24.45 kg/m2  Body mass index is 24.45 kg/(m^2).  GENERAL: healthy, alert and no distress  RESP: " lungs clear to auscultation - no rales, rhonchi or wheezes  CV: regular rate and rhythm, normal S1 S2, no S3 or S4, no murmur, click or rub, no peripheral edema and peripheral pulses strong  SKIN: erythema - and upper chest and back, red, raised border.   PSYCH: mentation appears normal, affect normal/bright    Results for orders placed or performed in visit on 09/22/17 (from the past 24 hour(s))   KOH prep (skin, hair or nails only)   Result Value Ref Range    Specimen Description Skin     KOH Skin Hair Nails Test Fungal elements seen (A)        ASSESSMENT/PLAN:             1. Rash and nonspecific skin eruption  KOH scraping done today.   - KOH prep (skin, hair or nails only)    2. Fungal infection    ARASELI shows fungal infection. Will treat with terbinafine for two weeks and see if improving. May need up to one month and LFT's checked.         Nasra Peraza, NP  [facility]

## 2017-09-22 NOTE — MR AVS SNAPSHOT
"              After Visit Summary   2017    Avtar Hodge    MRN: [id number]           Patient Information     Date Of Birth          1970        Visit Information        Provider Department      2017 10:45 AM Nasra Peraza NP [facility]        Today's Diagnoses     Rash and nonspecific skin eruption    -  1    Fungal infection           Follow-ups after your visit        Who to contact     If you have questions or need follow up information about today's clinic visit or your schedule please contact [facility] directly at [phone].  Normal or non-critical lab and imaging results will be communicated to you by [employer], letter or phone within 4 business days after the clinic has received the results. If you do not hear from us within 7 days, please contact the clinic through [employer] or phone. If you have a critical or abnormal lab result, we will notify you by phone as soon as possible.  Submit refill requests through [employer] or call your pharmacy and they will forward the refill request to us. Please allow 3 business days for your refill to be completed.          Additional Information About Your Visit        MyChart Information     [employer] lets you send messages to your doctor, view your test results, renew your prescriptions, schedule appointments and more. To sign up, go to [email or web address] . Click on \"Log in\" on the left side of the screen, which will take you to the Welcome page. Then click on \"Sign up Now\" on the right side of the page.     You will be asked to enter the access code listed below, as well as some personal information. Please follow the directions to create your username and password.     Your access code is: [email or web address]  Expires: 2017  9:13 PM     Your access code will  in 90 days. If you need help or a new code, please call your [facility] or [phone].        Care EveryWhere ID     This is your Care EveryWhere ID. " "This could be used by other organizations to access your [facility] medical records  [id number]        Your Vitals Were     Pulse Temperature Height Pulse Oximetry BMI (Body Mass Index)       66 98.3  F (36.8  C) (Oral) 5' 2\" (1.575 m) 97% 24.45 kg/m2        Blood Pressure from Last 3 Encounters:   09/22/17 102/64   08/21/17 114/66   07/17/17 118/70    Weight from Last 3 Encounters:   09/22/17 133 lb 11.2 oz (60.6 kg)   08/21/17 130 lb (59 kg)   07/17/17 132 lb 11.2 oz (60.2 kg)              We Performed the Following     KOH prep (skin, hair or nails only)          Today's Medication Changes          These changes are accurate as of: 9/22/17  1:22 PM.  If you have any questions, ask your nurse or doctor.               Start taking these medicines.        Dose/Directions    terbinafine 250 MG tablet   Commonly known as:  lamISIL   Used for:  Fungal infection   Started by:  Nasra Peraza NP        Dose:  250 mg   Take 1 tablet (250 mg) by mouth daily   Quantity:  14 tablet   Refills:  0            Where to get your medicines      These medications were sent to [facility] [address] [place] [address] AT [facility] [address] [address]    Hours:  24-hours Phone:  [phone]     terbinafine 250 MG tablet                Primary Care Provider Office Phone # Fax #    Nasra Peraza [phone]       [facility] [id number] [place] [address]        Equal Access to Services     ORAL OSORIO AH: Hadsam masterson Sojem, waaxda luqadaha, qaybta kaalmawillow brenner. So [facility] [phone].    ATENCIÓN: Si habla español, tiene a doshi disposición servicios gratuitos de asistencia lingüística. Conchis al [phone].    We comply with applicable federal civil rights laws and [place] laws. We do not discriminate on the basis of race, color, national origin, age, disability sex, sexual " orientation or gender identity.            Thank you!     Thank you for choosing [facility]  for your care. Our goal is always to provide you with excellent care. Hearing back from our patients is one way we can continue to improve our services. Please take a few minutes to complete the written survey that you may receive in the mail after your visit with us. Thank you!             Your Updated Medication List - Protect others around you: Learn how to safely use, store and throw away your medicines at www.disposemymeds.org.          This list is accurate as of: 9/22/17  1:22 PM.  Always use your most recent med list.                   Brand Name Dispense Instructions for use Diagnosis    ibuprofen 600 MG tablet    ADVIL/MOTRIN    30 tablet    Take 1 tablet (600 mg) by mouth every 6 hours as needed for moderate pain    Pain of right upper arm, Herpes zoster without complication       ketoconazole 2 % cream    NIZORAL    60 g    Apply topically 2 times daily    Rash       sildenafil 100 MG tablet    VIAGRA    6 tablet    Take 0.5-1 tablets ( mg) by mouth daily as needed for erectile dysfunction Take 30 min to 4 hours before intercourse.  Never use with nitroglycerin, terazosin or doxazosin.    Erectile dysfunction, unspecified erectile dysfunction type       terbinafine 250 MG tablet    lamISIL    14 tablet    Take 1 tablet (250 mg) by mouth daily    Fungal infection       triamcinolone 0.1 % cream    KENALOG    85.2 g    Apply sparingly to affected area three times daily for 14 days.    Rash

## 2017-09-22 NOTE — NURSING NOTE
"Chief Complaint   Patient presents with     RECHECK     Rash       Initial /64 (BP Location: Right arm, Patient Position: Sitting, Cuff Size: Adult Regular)  Pulse 66  Temp 98.3  F (36.8  C) (Oral)  Ht 5' 2\" (1.575 m)  Wt 133 lb 11.2 oz (60.6 kg)  SpO2 97%  BMI 24.45 kg/m2 Estimated body mass index is 24.45 kg/(m^2) as calculated from the following:    Height as of this encounter: 5' 2\" (1.575 m).    Weight as of this encounter: 133 lb 11.2 oz (60.6 kg).  Medication Reconciliation: complete     Kris CONTRERAS      "

## 2019-05-22 ENCOUNTER — OFFICE VISIT (OUTPATIENT)
Dept: URGENT CARE | Facility: URGENT CARE | Age: 49
End: 2019-05-22
Payer: COMMERCIAL

## 2019-05-22 ENCOUNTER — HOSPITAL ENCOUNTER (EMERGENCY)
Facility: CLINIC | Age: 49
Discharge: HOME | End: 2019-05-22
Attending: EMERGENCY MEDICINE | Admitting: EMERGENCY MEDICINE
Payer: COMMERCIAL

## 2019-05-22 VITALS
WEIGHT: 132.28 LBS | OXYGEN SATURATION: 99 % | RESPIRATION RATE: 18 BRPM | TEMPERATURE: 97.6 F | SYSTOLIC BLOOD PRESSURE: 114 MMHG | BODY MASS INDEX: 24.19 KG/M2 | DIASTOLIC BLOOD PRESSURE: 90 MMHG

## 2019-05-22 VITALS
OXYGEN SATURATION: 97 % | DIASTOLIC BLOOD PRESSURE: 72 MMHG | HEART RATE: 64 BPM | SYSTOLIC BLOOD PRESSURE: 119 MMHG | TEMPERATURE: 98.7 F

## 2019-05-22 DIAGNOSIS — T15.92XA FOREIGN BODY OF LEFT EYE, INITIAL ENCOUNTER: Primary | ICD-10-CM

## 2019-05-22 DIAGNOSIS — S05.02XA ABRASION OF LEFT CORNEA, INITIAL ENCOUNTER: ICD-10-CM

## 2019-05-22 DIAGNOSIS — T15.92XA FOREIGN BODY OF LEFT EYE, INITIAL ENCOUNTER: ICD-10-CM

## 2019-05-22 PROCEDURE — 25000125 ZZHC RX 250: Performed by: EMERGENCY MEDICINE

## 2019-05-22 PROCEDURE — 65205 REMOVE FOREIGN BODY FROM EYE: CPT | Mod: LT

## 2019-05-22 PROCEDURE — 99283 EMERGENCY DEPT VISIT LOW MDM: CPT | Mod: 25

## 2019-05-22 PROCEDURE — 25000132 ZZH RX MED GY IP 250 OP 250 PS 637: Performed by: EMERGENCY MEDICINE

## 2019-05-22 PROCEDURE — 99213 OFFICE O/P EST LOW 20 MIN: CPT | Performed by: FAMILY MEDICINE

## 2019-05-22 RX ORDER — PROPARACAINE HYDROCHLORIDE 5 MG/ML
2 SOLUTION/ DROPS OPHTHALMIC ONCE
Status: DISCONTINUED | OUTPATIENT
Start: 2019-05-22 | End: 2019-05-22

## 2019-05-22 RX ORDER — OXYCODONE AND ACETAMINOPHEN 5; 325 MG/1; MG/1
1 TABLET ORAL EVERY 6 HOURS PRN
Qty: 10 TABLET | Refills: 0 | Status: SHIPPED | OUTPATIENT
Start: 2019-05-22 | End: 2019-05-22

## 2019-05-22 RX ORDER — POLYMYXIN B SULFATE AND TRIMETHOPRIM 1; 10000 MG/ML; [USP'U]/ML
1-2 SOLUTION OPHTHALMIC EVERY 4 HOURS
Qty: 1 BOTTLE | Refills: 0 | Status: SHIPPED | OUTPATIENT
Start: 2019-05-22 | End: 2022-10-25

## 2019-05-22 RX ORDER — HYDROCODONE BITARTRATE AND ACETAMINOPHEN 5; 325 MG/1; MG/1
1 TABLET ORAL EVERY 6 HOURS PRN
Qty: 18 TABLET | Refills: 0 | Status: SHIPPED | OUTPATIENT
Start: 2019-05-22 | End: 2019-05-25

## 2019-05-22 RX ORDER — OXYCODONE AND ACETAMINOPHEN 5; 325 MG/1; MG/1
1 TABLET ORAL EVERY 6 HOURS PRN
Qty: 12 TABLET | Refills: 0 | Status: SHIPPED | OUTPATIENT
Start: 2019-05-22 | End: 2019-05-25

## 2019-05-22 RX ORDER — HYDROCODONE BITARTRATE AND ACETAMINOPHEN 5; 325 MG/1; MG/1
2 TABLET ORAL ONCE
Status: COMPLETED | OUTPATIENT
Start: 2019-05-22 | End: 2019-05-22

## 2019-05-22 RX ORDER — OFLOXACIN 3 MG/ML
2 SOLUTION/ DROPS OPHTHALMIC 4 TIMES DAILY
Qty: 5 ML | Refills: 0 | Status: SHIPPED | OUTPATIENT
Start: 2019-05-22 | End: 2019-05-29

## 2019-05-22 RX ORDER — TETRACAINE HYDROCHLORIDE 5 MG/ML
2 SOLUTION OPHTHALMIC ONCE
Status: COMPLETED | OUTPATIENT
Start: 2019-05-22 | End: 2019-05-22

## 2019-05-22 RX ADMIN — HYDROCODONE BITARTRATE AND ACETAMINOPHEN 2 TABLET: 5; 325 TABLET ORAL at 22:07

## 2019-05-22 RX ADMIN — TETRACAINE HYDROCHLORIDE 2 DROP: 5 SOLUTION OPHTHALMIC at 22:07

## 2019-05-22 ASSESSMENT — ENCOUNTER SYMPTOMS
EYE REDNESS: 1
EYE DISCHARGE: 1
EYE ITCHING: 1
PHOTOPHOBIA: 1
EYE PAIN: 1

## 2019-05-22 NOTE — ED AVS SNAPSHOT
[facility] Emergency Department  [address]  [facility] [address]  Phone:  [phone]  Fax:  [phone]                                    Avtar Hodge   MRN: [id number]    Department:  [facility] Emergency Department   Date of Visit:  5/22/2019           After Visit Summary Signature Page    I have received my discharge instructions, and my questions have been answered. I have discussed any challenges I see with this plan with the nurse or doctor.    ..........................................................................................................................................  Patient/Patient Representative Signature      ..........................................................................................................................................  Patient Representative Print Name and Relationship to Patient    ..................................................               ................................................  Date                                   Time    ..........................................................................................................................................  Reviewed by Signature/Title    ...................................................              ..............................................  Date                                               Time          22EPIC Rev 08/18

## 2019-05-23 NOTE — ED TRIAGE NOTES
Pt presents with L eye pain and blurry vision. States yesterday was working on table saw and got something in eye. Pain worse today. ABCs intact.

## 2019-05-23 NOTE — DISCHARGE INSTRUCTIONS
Warm washcloth over closed eyelid.    You will be sore!    Ice to the area.  Motrin (ie ibuprofen) or tylenol for mild pain.  Norco (ie tylenol with narcotic) for severe pain.  Followup with opthalmologist.    Eye antibiotic cream x 7 days.

## 2019-05-23 NOTE — PROGRESS NOTES
SUBJECTIVE:  Avtar Hodge is a 49 year old male with a feeling of a foreign body in his left eye.  He feels that this is sawdust was not wearing his goggles when he was cutting what    His eye is inflamed, he is complaining of pain    Unwilling to open his left eye  OBJECTIVE:  /72 (BP Location: Right arm, Patient Position: Chair, Cuff Size: Adult Regular)   Pulse 64   Temp 98.7  F (37.1  C) (Oral)   SpO2 97%   Exam;     Examination shows his left eye is reddened and swollen around the lid it is closed no mattering.  With proparacaine drops he is able to open it and he does have appear to have a foreign body that sits over the cornea.    This was removed lower leg appeared clear upper lid was very difficult to visualize underneath.    However what we could see appeared to be clear    Floor seen stain revealed a very minimal uptake of the flourosein.    ASSESSMENT:  1.   Foreign body left eye  2.  Small corneal abrasion; if at all very minimal lip uptake    PLAN:  1.  Polymyxin drops  2.  Percocet      We also discussed that he gets up in the morning and he appears to have continuing evidence of pain in his eye or foreign body he should call in and we will have him set up to be seen by the ophthalmologist.

## 2019-05-23 NOTE — ED PROVIDER NOTES
History     Chief Complaint:  Eye injury    HPI   Avtar Hodge is a 49 year old male who presents to the emergency department for evaluation of left eye injury. 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 The pain and irritation persisted today, prompting him to go the Urgent Care at [facility] where they gave him numbing eye drops, temporarily relieving irritation and pain, but did not find a foreign body; no slit lamp performed. He was prescribed Percocet and Polytrim at Urgent Care, however it is unknown if the patient has used either. While the patient was driving home from , the pain and irritation returned, leading him to present to the ED where the pain persists. The patient states he does not wear contacts or glasses.  He was working with lineVanderdroid but loose wood shavings were also present.  Td up to date.  Patient went to  and was referred to the ED.    Allergies:  NKDA     Medications:    The patient is currently on no regular medications.     Past Medical History:    The patient denies any significant past medical history.    Past Surgical History:    The patient does not have any pertinent past surgical history.    Family History:    No past pertinent family history.    Social History:  Presents with female .  Current every day smoker.  Positive for alcohol use.   Marital Status:  Single [1]     Review of Systems   Eyes: Positive for photophobia, pain, discharge, redness and itching.   All other systems reviewed and are negative.    Left eye pain, redness and tearing.    Physical Exam     Patient Vitals for the past 24 hrs:   BP Temp Temp src Heart Rate Resp SpO2 Weight   05/22/19 2139 114/90 97.6  F (36.4  C) Temporal 62 18 99 % 60 kg (132 lb 4.4 oz)     Visual Acuity-Right: 20/20 Visual Acuity-Left: 20/30 Vision - Corrective Lenses: None    Physical Exam   Eyes:          GEN: patient appears tired, wife at the bedside  HEAD: atraumatic, normocephalic  EYES: pupils reactive (3plus to 1plus bilaterally), extraocular muscles intact, conjunctivae injected on the left eye diffusely, everted eyelid and small 1mm punctate FB found and removed.  ENT: TMs flat and white bilaterally, oropharynx normal with no erythema or exudate, mucus membranes moist  NECK: no cervical LAD  RESPIRATORY: no tachypnea, breath sounds clear to auscultation (no rales, wheezes, rhonchi)  CVS: normal S1/S2, no murmurs/rubs/gallops  ABDOMEN: soft, nontender, no masses or organomegaly, no rebound, positive bowel sounds  EXTREMITIES: intact pulses x 4, full range of motion at joints, no edema  SKIN: warm and dry  NEURO: GCS 15, cranial nerves intact.  Motor- moves all 4 extremities Overall symmetrical exam  HEME: no bruising or petechiae/contusions  LYMPH: no lymphadenopathy  FUNDOSCOPIC EXAM:    Affected eye(s) placed in dark room and pupils dilated.  Opthalmoscope used to visualize optic disc.  No swelling/blurring of optic disc margins.  No papilledema.  Positive venous pulsations.  No AV nicking.    Emergency Department Course     Procedures:    Foreign Body Removal        LOCATION:  Left eyelid      FOREIGN BODY: Small debris    PROCEDURE: The debris was grasped using Q-tip and the foreign body was successfully removed. There were no immediate complications. The patient tolerated the procedure well.         Interventions:  2207 Pontocaine 2 drops both eyes   Norco 5-325 mg per tablet, 2 tablets PO    Emergency Department Course:  Nursing notes and vitals reviewed. 2202 I performed an exam of the patient as documented above. I performed a slit lamp exam and foreign body removal, details above. I discussed the results of his workup thus far.     Findings and plan explained to the Patient. Patient discharged home with instructions regarding supportive care, medications, and reasons to return. The importance  of close follow-up was reviewed.     SLIT LAMP EXAM:    Slit lamp findings to affected eye(s):  Anterior chamber normal with no cells or flare  Everted eyelids to find FB on the left side  Lids normal  Fluorescein staining positive on the left side    /90   Temp 97.6  F (36.4  C) (Temporal)   Resp 18   Wt 60 kg (132 lb 4.4 oz)   SpO2 99%   BMI 24.19 kg/m    Patient feels better with alcaine.  Fundoscopic exam negative.    Impression & Plan      Medical Decision Making:  Avtar Hodge is a 49 year old male who presents for evaluation of left eye pain. A broad differential diagnosis was considered including bacterial conjunctivitis, viral conjunctivitis, foreign body, corneal abrasion, chemical vs allergic conjunctivitis, corneal ulcer, HSV, herpes zoster opthalmicus, endopthalmitis, orbital cellulitis, etc.  Exam is consistent with a corneal abrasion. Lids everted and showed foreign body embedded to left eyelid, which was removed.  Patient had marked improvement in symptoms following this foreign body removal.   The patient was treated with antibiotics.  Instructed to return for visual acuity changes, fever, orbital swelling or any worsening.    Plan- followup with ophthalmology.  Referral to [facility] ophthalmology given.  Off work, pain medicine as needed.    Diagnosis:    ICD-10-CM   1. Foreign body of left eye, initial encounter T15.92XA   2. Abrasion of left cornea, initial encounter S05.02XA       Disposition:  discharged to home    Instructions to patient:  Warm washcloth over closed eyelid.    You will be sore!    Ice to the area.  Motrin (ie ibuprofen) or tylenol for mild pain.  Norco (ie tylenol with narcotic) for severe pain.  Followup with opthalmologist.    Eye antibiotic cream x 7 days.  ILaron, am serving as a scribe on 5/22/2019 at 10:28 PM to personally document services performed by Mercedes Phillips MD based on my observations and the provider's statements to me.     Alethea NUNN,  am serving as a scribe on 5/22/2019 at 10:28 PM to personally document services performed by Mercedes Phillips MD based on my observations and the provider's statements to me.     Alethea Bennett  5/22/2019   [facility] EMERGENCY DEPARTMENT       Mercedes Phillips MD  05/25/19 9733

## 2019-11-05 ENCOUNTER — HOSPITAL ENCOUNTER (EMERGENCY)
Facility: CLINIC | Age: 49
Discharge: HOME | End: 2019-11-05
Attending: EMERGENCY MEDICINE | Admitting: EMERGENCY MEDICINE
Payer: COMMERCIAL

## 2019-11-05 VITALS
WEIGHT: 126.32 LBS | SYSTOLIC BLOOD PRESSURE: 113 MMHG | HEIGHT: 61 IN | RESPIRATION RATE: 20 BRPM | TEMPERATURE: 98.1 F | BODY MASS INDEX: 23.85 KG/M2 | DIASTOLIC BLOOD PRESSURE: 82 MMHG | OXYGEN SATURATION: 99 %

## 2019-11-05 DIAGNOSIS — B02.9 HERPES ZOSTER WITHOUT COMPLICATION: ICD-10-CM

## 2019-11-05 DIAGNOSIS — M79.621 PAIN OF RIGHT UPPER ARM: ICD-10-CM

## 2019-11-05 DIAGNOSIS — S05.02XA ABRASION OF LEFT CORNEA, INITIAL ENCOUNTER: ICD-10-CM

## 2019-11-05 PROCEDURE — 90471 IMMUNIZATION ADMIN: CPT

## 2019-11-05 PROCEDURE — 90715 TDAP VACCINE 7 YRS/> IM: CPT | Performed by: EMERGENCY MEDICINE

## 2019-11-05 PROCEDURE — 25000128 H RX IP 250 OP 636: Performed by: EMERGENCY MEDICINE

## 2019-11-05 PROCEDURE — 99283 EMERGENCY DEPT VISIT LOW MDM: CPT | Mod: 25

## 2019-11-05 RX ORDER — IBUPROFEN 600 MG/1
600 TABLET, FILM COATED ORAL EVERY 6 HOURS PRN
Qty: 30 TABLET | Refills: 1 | Status: SHIPPED | OUTPATIENT
Start: 2019-11-05

## 2019-11-05 RX ORDER — ERYTHROMYCIN 5 MG/G
0.5 OINTMENT OPHTHALMIC 4 TIMES DAILY
Qty: 1 TUBE | Refills: 0 | Status: SHIPPED | OUTPATIENT
Start: 2019-11-05 | End: 2019-11-10

## 2019-11-05 RX ADMIN — CLOSTRIDIUM TETANI TOXOID ANTIGEN (FORMALDEHYDE INACTIVATED), CORYNEBACTERIUM DIPHTHERIAE TOXOID ANTIGEN (FORMALDEHYDE INACTIVATED), BORDETELLA PERTUSSIS TOXOID ANTIGEN (GLUTARALDEHYDE INACTIVATED), BORDETELLA PERTUSSIS FILAMENTOUS HEMAGGLUTININ ANTIGEN (FORMALDEHYDE INACTIVATED), BORDETELLA PERTUSSIS PERTACTIN ANTIGEN, AND BORDETELLA PERTUSSIS FIMBRIAE 2/3 ANTIGEN 0.5 ML: 5; 2; 2.5; 5; 3; 5 INJECTION, SUSPENSION INTRAMUSCULAR at 11:39

## 2019-11-05 ASSESSMENT — ENCOUNTER SYMPTOMS
HEADACHES: 0
EYE PAIN: 1
FEVER: 0
EYE REDNESS: 1

## 2019-11-05 ASSESSMENT — MIFFLIN-ST. JEOR: SCORE: 1311.38

## 2019-11-05 NOTE — ED PROVIDER NOTES
"  History     Chief Complaint:  Foreign Body in Eye      HPI   Avtar Hodge is a 49 year old male who presents with concerns for a foreign body in his left eye. Patient notes he was working on a project last night and got saw dust stuck in his left eye and has been experiencing left eye pain since then. He was cutting laminate. Patient does not wear contacts or glasses. He states this happened before and he rubbed his eyes, but he did not rub this time. Tetanus status is unknown. He denies any fevers, headache, pain with EOM, redness to face or other symptoms.     Allergies:  No known drug allergies.    Medications:    Ketoconazole 2% cream  Sildenafil      Past Medical History:    History reviewed.  No significant past medical history.     Past Surgical History:    History reviewed. No pertinent past surgical history.    Family History:    History reviewed. No pertinent family history.    Social History:  Presents to the ED by himself.   Tobacco Use: Current every day smoker  Alcohol Use: Yes  PCP: Physician No Ref-Primary  Marital Status:  Single [1]     Review of Systems   Constitutional: Negative for fever.   Eyes: Positive for pain and redness.   Neurological: Negative for headaches.     Physical Exam   First Vitals:  BP: 113/82  Heart Rate: 76  Temp: 98.1  F (36.7  C)  Resp: 20  Height: 154.9 cm (5' 1\")  Weight: 57.3 kg (126 lb 5.2 oz)  SpO2: 99 %  Visual acuity right: 20/20  Visual acuity left: 20/25, \"Blurry\" per patient    Physical Exam   General: Resting comfortably   Head:  The scalp, face, and head appear normal  Eyes:  - Mild L. conjunctival injection; R. Conjunctiva normal  - EOMI  - No foreign body with eversion of the lids  - + fluorescein uptake to suggest abrasion at 6 o'clock position on L. Eye; no retained FB visualized.  No Lyn sign  - No hypopion or hyphema  -No periorbital swelling/erythema  ENT:    The nose is normal  Neck:  Normal range of motion  MSK:  Moves all extremities " equally  Skin:  No rash or lesions noted.  Neuro: Speech is normal and fluent  Psych:  Awake. Alert.  Normal affect.        Emergency Department Course     Interventions:  Tdap, 0.5 mL, IM    Emergency Department Course:  The patient arrived in triage where vitals were measured and recorded.   The patient was then escorted back to the emergency department.   The patient's medical records were reviewed.  Nursing notes and vitals were reviewed.  1046: I performed an exam of the patient as documented above.    Findings and plan explained to the Patient. Patient discharged home, status improved, with instructions regarding supportive care, medications, and reasons to return as well as the importance of close follow-up was reviewed. Patient was prescribed erythromycin ophthalmic ointment.     Impression & Plan      Medical Decision Making  Avtar Hodge is a 49 year old male who presents for evaluation of eye pain. A broad differential diagnosis was considered including bacterial conjunctivitis, viral conjunctivitis, foreign body, corneal abrasion, chemical vs allergic conjunctivitis, corneal ulcer, HSV, herpes zoster opthalmicus, endopthalmitis, orbital cellulitis, etc.  Exam is consistent with a corneal abrasion. No retained FB visualized. Tetanus updated. Antibiotics and pain meds will be initiated.  Plans to follow-up with ophtho for a 24 hour recheck.  Instructed to return for visual acuity changes, fever, orbital swelling or any worsening symptoms.      Diagnosis:    ICD-10-CM    1. Abrasion of left cornea, initial encounter S05.02XA        Disposition:  Discharged to home.     Discharge Medications:  New Prescriptions    ERYTHROMYCIN (ROMYCIN) 5 MG/GM OPHTHALMIC OINTMENT    Place 0.5 inches Into the left eye 4 times daily for 5 days       Veronica NUNN, meena serving as a scribe on 11/5/2019 at 10:46 AM to personally document services performed by Dr. Gold based on my observations and the provider's statements  to me.   [facility] EMERGENCY DEPARTMENT       Kaylah Gold,   11/05/19 110

## 2019-11-05 NOTE — ED AVS SNAPSHOT
[facility] Emergency Department  [address]  [facility] [address]  Phone:  [phone]  Fax:  [phone]                                    Avtar Hodge   MRN: [id number]    Department:  [facility] Emergency Department   Date of Visit:  11/5/2019           After Visit Summary Signature Page    I have received my discharge instructions, and my questions have been answered. I have discussed any challenges I see with this plan with the nurse or doctor.    ..........................................................................................................................................  Patient/Patient Representative Signature      ..........................................................................................................................................  Patient Representative Print Name and Relationship to Patient    ..................................................               ................................................  Date                                   Time    ..........................................................................................................................................  Reviewed by Signature/Title    ...................................................              ..............................................  Date                                               Time          22EPIC Rev 08/18

## 2022-10-21 NOTE — PROGRESS NOTES
Pre-Visit Planning   Next 5 appointments (look out 90 days)    Oct 24, 2022 11:00 AM  (Arrive by 10:40 AM)  Adult Preventative Visit with Karel Lancaster DO  [facility] ([facility] ) [address] [facility] [address] [phone]        Appointment Notes for this encounter:   Yearly Physical    Questionnaires Reviewed/Assigned  No additional questionnaires are needed    Patient preferred phone number: [phone]    Unable to reach. Left voicemail. Advised patient to call clinic back at [phone].    Elidia Leone/

## 2022-10-24 ENCOUNTER — OFFICE VISIT (OUTPATIENT)
Dept: FAMILY MEDICINE | Facility: CLINIC | Age: 52
End: 2022-10-24
Payer: COMMERCIAL

## 2022-10-24 VITALS
TEMPERATURE: 97.8 F | DIASTOLIC BLOOD PRESSURE: 80 MMHG | WEIGHT: 133 LBS | SYSTOLIC BLOOD PRESSURE: 104 MMHG | RESPIRATION RATE: 12 BRPM | BODY MASS INDEX: 24.48 KG/M2 | HEIGHT: 62 IN | HEART RATE: 66 BPM | OXYGEN SATURATION: 99 %

## 2022-10-24 DIAGNOSIS — Z12.11 SCREENING FOR COLON CANCER: ICD-10-CM

## 2022-10-24 DIAGNOSIS — Z00.00 ROUTINE GENERAL MEDICAL EXAMINATION AT A HEALTH CARE FACILITY: ICD-10-CM

## 2022-10-24 DIAGNOSIS — Z13.220 SCREENING FOR HYPERLIPIDEMIA: ICD-10-CM

## 2022-10-24 DIAGNOSIS — Z13.21 ENCOUNTER FOR VITAMIN DEFICIENCY SCREENING: ICD-10-CM

## 2022-10-24 LAB
ALBUMIN SERPL-MCNC: 4 G/DL (ref 3.4–5)
ALP SERPL-CCNC: 61 U/L (ref 40–150)
ALT SERPL W P-5'-P-CCNC: 45 U/L (ref 0–70)
ANION GAP SERPL CALCULATED.3IONS-SCNC: 8 MMOL/L (ref 3–14)
AST SERPL W P-5'-P-CCNC: 27 U/L (ref 0–45)
BILIRUB SERPL-MCNC: 0.4 MG/DL (ref 0.2–1.3)
BUN SERPL-MCNC: 20 MG/DL (ref 7–30)
CALCIUM SERPL-MCNC: 9 MG/DL (ref 8.5–10.1)
CHLORIDE BLD-SCNC: 106 MMOL/L (ref 94–109)
CHOLEST SERPL-MCNC: 223 MG/DL
CO2 SERPL-SCNC: 23 MMOL/L (ref 20–32)
CREAT SERPL-MCNC: 0.76 MG/DL (ref 0.66–1.25)
FASTING STATUS PATIENT QL REPORTED: ABNORMAL
GFR SERPL CREATININE-BSD FRML MDRD: >90 ML/MIN/1.73M2
GLUCOSE BLD-MCNC: 103 MG/DL (ref 70–99)
HDLC SERPL-MCNC: 52 MG/DL
LDLC SERPL CALC-MCNC: 96 MG/DL
NONHDLC SERPL-MCNC: 171 MG/DL
POTASSIUM BLD-SCNC: 4.1 MMOL/L (ref 3.4–5.3)
PROT SERPL-MCNC: 7.4 G/DL (ref 6.8–8.8)
SODIUM SERPL-SCNC: 137 MMOL/L (ref 133–144)
TRIGL SERPL-MCNC: 376 MG/DL

## 2022-10-24 PROCEDURE — 91312 COVID-19,PF,PFIZER BOOSTER BIVALENT: CPT | Performed by: FAMILY MEDICINE

## 2022-10-24 PROCEDURE — 99386 PREV VISIT NEW AGE 40-64: CPT | Mod: 25 | Performed by: FAMILY MEDICINE

## 2022-10-24 PROCEDURE — 80061 LIPID PANEL: CPT | Performed by: FAMILY MEDICINE

## 2022-10-24 PROCEDURE — 90682 RIV4 VACC RECOMBINANT DNA IM: CPT | Performed by: FAMILY MEDICINE

## 2022-10-24 PROCEDURE — 36415 COLL VENOUS BLD VENIPUNCTURE: CPT | Performed by: FAMILY MEDICINE

## 2022-10-24 PROCEDURE — 90471 IMMUNIZATION ADMIN: CPT | Performed by: FAMILY MEDICINE

## 2022-10-24 PROCEDURE — 0124A COVID-19,PF,PFIZER BOOSTER BIVALENT: CPT | Performed by: FAMILY MEDICINE

## 2022-10-24 PROCEDURE — 80053 COMPREHEN METABOLIC PANEL: CPT | Performed by: FAMILY MEDICINE

## 2022-10-24 PROCEDURE — 82306 VITAMIN D 25 HYDROXY: CPT | Performed by: FAMILY MEDICINE

## 2022-10-24 SDOH — ECONOMIC STABILITY: TRANSPORTATION INSECURITY
IN THE PAST 12 MONTHS, HAS THE LACK OF TRANSPORTATION KEPT YOU FROM MEDICAL APPOINTMENTS OR FROM GETTING MEDICATIONS?: NO

## 2022-10-24 SDOH — ECONOMIC STABILITY: INCOME INSECURITY: HOW HARD IS IT FOR YOU TO PAY FOR THE VERY BASICS LIKE FOOD, HOUSING, MEDICAL CARE, AND HEATING?: NOT VERY HARD

## 2022-10-24 SDOH — HEALTH STABILITY: PHYSICAL HEALTH: ON AVERAGE, HOW MANY DAYS PER WEEK DO YOU ENGAGE IN MODERATE TO STRENUOUS EXERCISE (LIKE A BRISK WALK)?: 1 DAY

## 2022-10-24 SDOH — ECONOMIC STABILITY: FOOD INSECURITY: WITHIN THE PAST 12 MONTHS, THE FOOD YOU BOUGHT JUST DIDN'T LAST AND YOU DIDN'T HAVE MONEY TO GET MORE.: NEVER TRUE

## 2022-10-24 SDOH — HEALTH STABILITY: PHYSICAL HEALTH: ON AVERAGE, HOW MANY MINUTES DO YOU ENGAGE IN EXERCISE AT THIS LEVEL?: 30 MIN

## 2022-10-24 SDOH — ECONOMIC STABILITY: FOOD INSECURITY: WITHIN THE PAST 12 MONTHS, YOU WORRIED THAT YOUR FOOD WOULD RUN OUT BEFORE YOU GOT MONEY TO BUY MORE.: NEVER TRUE

## 2022-10-24 SDOH — ECONOMIC STABILITY: TRANSPORTATION INSECURITY
IN THE PAST 12 MONTHS, HAS LACK OF TRANSPORTATION KEPT YOU FROM MEETINGS, WORK, OR FROM GETTING THINGS NEEDED FOR DAILY LIVING?: NO

## 2022-10-24 SDOH — ECONOMIC STABILITY: INCOME INSECURITY: IN THE LAST 12 MONTHS, WAS THERE A TIME WHEN YOU WERE NOT ABLE TO PAY THE MORTGAGE OR RENT ON TIME?: NO

## 2022-10-24 ASSESSMENT — ENCOUNTER SYMPTOMS
HEMATURIA: 0
PALPITATIONS: 0
JOINT SWELLING: 0
HEARTBURN: 0
PARESTHESIAS: 0
ABDOMINAL PAIN: 0
CONSTIPATION: 0
HEADACHES: 0
DIARRHEA: 0
WEAKNESS: 0
COUGH: 0
FREQUENCY: 0
NERVOUS/ANXIOUS: 0
NAUSEA: 0
DYSURIA: 0
MYALGIAS: 0
EYE PAIN: 0
FEVER: 0
ARTHRALGIAS: 1
HEMATOCHEZIA: 0
DIZZINESS: 0
SHORTNESS OF BREATH: 0
SORE THROAT: 0
CHILLS: 0

## 2022-10-24 ASSESSMENT — LIFESTYLE VARIABLES
HOW MANY STANDARD DRINKS CONTAINING ALCOHOL DO YOU HAVE ON A TYPICAL DAY: PATIENT DECLINED
HOW OFTEN DO YOU HAVE A DRINK CONTAINING ALCOHOL: PATIENT DECLINED
HOW OFTEN DO YOU HAVE SIX OR MORE DRINKS ON ONE OCCASION: PATIENT DECLINED
SKIP TO QUESTIONS 9-10: 0
AUDIT-C TOTAL SCORE: -1

## 2022-10-24 ASSESSMENT — SOCIAL DETERMINANTS OF HEALTH (SDOH)
IN A TYPICAL WEEK, HOW MANY TIMES DO YOU TALK ON THE PHONE WITH FAMILY, FRIENDS, OR NEIGHBORS?: MORE THAN THREE TIMES A WEEK
DO YOU BELONG TO ANY CLUBS OR ORGANIZATIONS SUCH AS CHURCH GROUPS UNIONS, FRATERNAL OR ATHLETIC GROUPS, OR SCHOOL GROUPS?: NO
ARE YOU MARRIED, WIDOWED, DIVORCED, SEPARATED, NEVER MARRIED, OR LIVING WITH A PARTNER?: LIVING WITH PARTNER
HOW OFTEN DO YOU ATTEND CHURCH OR RELIGIOUS SERVICES?: MORE THAN 4 TIMES PER YEAR
HOW OFTEN DO YOU GET TOGETHER WITH FRIENDS OR RELATIVES?: ONCE A WEEK

## 2022-10-24 NOTE — PATIENT INSTRUCTIONS
Consider getting the shingles vaccine at a pharmacy, where it may be less expensive then the $340 approximate price at clinic. The 20  pneumonia vaccination was about $800, and may be less expensive at a pharmacy. Let me know if you want a referral for individual or couples counseling.     Preventive Health Recommendations  Male Ages 50 - 64    Yearly exam:             See your health care provider every year in order to  o   Review health changes.   o   Discuss preventive care.    o   Review your medicines if your doctor has prescribed any.   Have a cholesterol test every 5 years, or more frequently if you are at risk for high cholesterol/heart disease.   Have a diabetes test (fasting glucose) every three years. If you are at risk for diabetes, you should have this test more often.   Have a colonoscopy at age 50, or have a yearly FIT test (stool test). These exams will check for colon cancer.    Talk with your health care provider about whether or not a prostate cancer screening test (PSA) is right for you.  You should be tested each year for STDs (sexually transmitted diseases), if you re at risk.     Shots: Get a flu shot each year. Get a tetanus shot every 10 years.     Nutrition:  Eat at least 5 servings of fruits and vegetables daily.   Eat whole-grain bread, whole-wheat pasta and brown rice instead of white grains and rice.   Get adequate Calcium and Vitamin D.     Lifestyle  Exercise for at least 150 minutes a week (30 minutes a day, 5 days a week). This will help you control your weight and prevent disease.   Limit alcohol to one drink per day.   No smoking.   Wear sunscreen to prevent skin cancer.   See your dentist every six months for an exam and cleaning.   See your eye doctor every 1 to 2 years.

## 2022-10-24 NOTE — PROGRESS NOTES
SUBJECTIVE:   CC: Avtar is an 52 year old who presents for preventative health visit.       Patient has been advised of split billing requirements and indicates understanding: Yes  Healthy Habits:     Getting at least 3 servings of Calcium per day:  Yes    Bi-annual eye exam:  NO    Dental care twice a year:  NO    Sleep apnea or symptoms of sleep apnea:  None    Diet:  Regular (no restrictions)    Frequency of exercise:  None    Taking medications regularly:  Yes    PHQ-2 Total Score: 0    Additional concerns today:  No          Today's PHQ-2 Score:   PHQ-2 ( 1999 Pf[employer]) 10/24/2022   Q1: Little interest or pleasure in doing things 0   Q2: Feeling down, depressed or hopeless 0   PHQ-2 Score 0   Q1: Little interest or pleasure in doing things Not at all   Q2: Feeling down, depressed or hopeless Not at all   PHQ-2 Score 0       Abuse: Current or Past(Physical, Sexual or Emotional)- No  Do you feel safe in your environment? Yes    Have you ever done Advance Care Planning? (For example, a Health Directive, POLST, or a discussion with a medical provider or your loved ones about your wishes): No, advance care planning information given to patient to review.  Patient declined advance care planning discussion at this time.    Social History     Tobacco Use     Smoking status: Every Day     Packs/day: 1.00     Types: Cigarettes     Smokeless tobacco: Never   Substance Use Topics     Alcohol use: Yes     Comment: every Friday and Saturday         Alcohol Use 10/24/2022   Prescreen: >3 drinks/day or >7 drinks/week? Yes   Prescreen: >3 drinks/day or >7 drinks/week? -   AUDIT SCORE  8       Last PSA: No results found for: PSA    Reviewed orders with patient. Reviewed health maintenance and updated orders accordingly - Yes      Reviewed and updated as needed this visit by clinical staff   Tobacco  Allergies  Meds   Med Hx  Surg Hx  Fam Hx  Soc Hx        Reviewed and updated as needed this visit by Provider     Meds     Fam  Hx             Review of Systems   Constitutional: Negative for chills and fever.   HENT: Negative for congestion, ear pain, hearing loss and sore throat.    Eyes: Negative for pain and visual disturbance.   Respiratory: Negative for cough and shortness of breath.    Cardiovascular: Negative for chest pain, palpitations and peripheral edema.   Gastrointestinal: Negative for abdominal pain, constipation, diarrhea, heartburn, hematochezia and nausea.   Genitourinary: Negative for dysuria, frequency, genital sores, hematuria, impotence, penile discharge and urgency.   Musculoskeletal: Positive for arthralgias. Negative for joint swelling and myalgias.   Skin: Negative for rash.   Neurological: Negative for dizziness, weakness, headaches and paresthesias.   Psychiatric/Behavioral: Negative for mood changes. The patient is not nervous/anxious.      Smokes 1 PPD tobacco.  He is not ready to quit, declining physician directed help to do so this visit.      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 No pain at exam.  He does not feel he needs x-rays studies, or referral to physical therapy or orthopedic specialist to further evaluate this.    Patient told me that his sexual relationship with his long-term female partner is not going well related to his preoccupation with gambling.  He tends to [place] on professional sports, thinking about this topic several times per day.  He was wondering if there was a medication treatment to help him with his relationship difficulties, and I advised pursuing counseling to discuss both his gambling, and his relationship difficulties.  He declined this for now.  His partner does not know about his gambling.  He did not feel he got any benefit from previous use of sildenafil, and does not believe he needs treatment for erectile dysfunction.    OBJECTIVE:   /80 (Cuff Size: Adult Regular)   Pulse 66   Temp 97.8  F (36.6  C) (Oral)   Resp 12   " Ht 1.575 m (5' 2\")   Wt 60.3 kg (133 lb)   SpO2 99%   BMI 24.33 kg/m      Physical Exam  General: Vital signs reviewed.  Patient is in no acute appearing distress.  Breathing appears nonlabored.  Patient is alert and oriented ×3.      ENT: Ear exam shows bilateral tympanic membranes to be clear without injection, nasal turbinates show no injection or edema, no pharyngeal injection or exudate.    Neck: supple with no adenoapthy, palpable abnormal masses, or thyroid abnormality.    Eyes: No scleral, lid, or periorbital injection or edema noted.  No eye mattering noted.  Corneas are clear. Pupils are equal round and reactive to light with normal consensual eye movement.    Heart: Heart rate is regular without murmur.    Lungs: Lungs are clear to auscultation with good airflow bilaterally.    Abdomen:  Abdomen is soft, nontender.  No palpable abnormal masses or organomegaly.  Bowel sounds are normal.    Genital exam:   No urethral discharge noted.  No visible skin abnormalities.  No inguinal hernia palpated while standing during a cough.    Back: No areas of tenderness.    Skin: Warm and dry, with no rash or abnormal lesions noted.    Extremities: No lower leg edema noted.  No joint edema or restricted range of motion noted.    Neuro: No acute focal deficits or other abnormalities noted.    Psych: Patient is very pleasant, making good eye contact, with clear and fluent speech.  Answers questions appropriately.        ASSESSMENT/PLAN:   Avtar was seen today for physical.    Diagnoses and all orders for this visit:  See after visit summary and result note from studies for helpful information and advice given to patient.    Routine general medical examination at a health care facility  -     Comprehensive metabolic panel    Encounter for vitamin deficiency screening  -     Vitamin D Deficiency    Screening for hyperlipidemia  -     Lipid Profile    Screening for colon cancer  -     Fecal colorectal cancer screen (FIT); " "Future  -     Fecal colorectal cancer screen (FIT)    Other orders  -     REVIEW OF HEALTH MAINTENANCE PROTOCOL ORDERS  -     Cancel: INFLUENZA QUAD, RECOMBINANT, P-FREE (RIV4) (FLUBLOK) AGE 50-64 [JKU696]  -     INFLUENZA QUAD, RECOMBINANT, P-FREE (RIV4) (FLUBLOK)  -     COVID-19,PF,PFIZER BOOSTER BIVALENT (12+YRS)              COUNSELING:   Reviewed preventive health counseling, as reflected in patient instructions    Estimated body mass index is 24.33 kg/m  as calculated from the following:    Height as of this encounter: 1.575 m (5' 2\").    Weight as of this encounter: 60.3 kg (133 lb).         He reports that he has been smoking cigarettes. He has been smoking an average of 1 pack per day. He has never used smokeless tobacco.  Nicotine/Tobacco Cessation Plan:   Information offered: Patient not interested at this time      Counseling Resources:  ATP IV Guidelines  Pooled Cohorts Equation Calculator  FRAX Risk Assessment  ICSI Preventive Guidelines  Dietary Guidelines for Americans, 2010  USDA's MyPlate  ASA Prophylaxis  Lung CA Screening    DO JUANITO Soliz [facility]  "

## 2022-10-24 NOTE — LETTER
"October 26, 2022      Avtar Hodge  [address] [facility] [address]        Dear ,    We are writing to inform you of your test results.    Your vitamin D level was on the lower end of the normal range.  Eat foods that have vitamin D in them such as fortified dairy products, and get out in the sunshine occasionally.     Your lipid panel showed some concerns, and I will give you advice on how to control your lipids at the end of my result note message. Your lipid panel assesses several lipid types. LDL or low-density lipoprotein cholesterol is sometimes known as the \"bad\" cholesterol.  HDL or high density lipoprotein cholesterol is sometimes known as the \"good\" cholesterol. Triglycerides can be atherogenic (cause blockage of blood vessels) in high levels. Non-HDL cholesterol includes your LDL cholesterol, intermediate-density lipoprotein (IDL), and very low-density lipoprotein (VLDL), all of which can be atherogenic when abnormally elevated.  We want your total cholesterol/HDL ratio to be lower then 5.  The results were as follows: Your lipid ratio was between 4 and 5.  Your non-HDL cholesterol and triglycerides were moderately elevated.  Your HDL cholesterol and LDL cholesterol was good.  I think good diet and exercise habits, and trying to quit tobacco smoking is the best management for now.  Can recheck your labs at your next visit.  Compared to about 5 years ago, your HDL cholesterol is much better!     Your comprehensive metabolic panel which assesses your kidney and liver function, electrolytes, and glucose level showed the following: Your glucose level was slightly elevated.  Your other lab results were normal.  I do think any new treatment is needed for now.  At your next routine visit, we can consider doing a blood test that checks your average blood glucose level over several weeks.  Follow the advice I would give you on prediabetes for now.  No new medication treatment is currently needed. "       Resulted Orders   Vitamin D Deficiency   Result Value Ref Range    Vitamin D, Total (25-Hydroxy) 24 20 - 75 ug/L    Narrative    Season, race, dietary intake, and treatment affect the concentration of 25-hydroxy-Vitamin D. Values may decrease during winter months and increase during summer months. Values 20-29 ug/L may indicate Vitamin D insufficiency and values <20 ug/L may indicate Vitamin D deficiency.    Vitamin D determination is routinely performed by an immunoassay specific for 25 hydroxyvitamin D3.  If an individual is on vitamin D2(ergocalciferol) supplementation, please specify 25 OH vitamin D2 and D3 level determination by LCMSMS test VITD23.     Lipid Profile   Result Value Ref Range    Cholesterol 223 (H) <200 mg/dL    Triglycerides 376 (H) <150 mg/dL    Direct Measure HDL 52 >=40 mg/dL    LDL Cholesterol Calculated 96 <=100 mg/dL    Non HDL Cholesterol 171 (H) <130 mg/dL    Patient Fasting > 8hrs? Unknown     Narrative    Cholesterol  Desirable:  <200 mg/dL    Triglycerides  Normal:  Less than 150 mg/dL  Borderline High:  150-199 mg/dL  High:  200-499 mg/dL  Very High:  Greater than or equal to 500 mg/dL    Direct Measure HDL  Female:  Greater than or equal to 50 mg/dL   Male:  Greater than or equal to 40 mg/dL    LDL Cholesterol  Desirable:  <100mg/dL  Above Desirable:  100-129 mg/dL   Borderline High:  130-159 mg/dL   High:  160-189 mg/dL   Very High:  >= 190 mg/dL    Non HDL Cholesterol  Desirable:  130 mg/dL  Above Desirable:  130-159 mg/dL  Borderline High:  160-189 mg/dL  High:  190-219 mg/dL  Very High:  Greater than or equal to 220 mg/dL   Comprehensive metabolic panel   Result Value Ref Range    Sodium 137 133 - 144 mmol/L    Potassium 4.1 3.4 - 5.3 mmol/L    Chloride 106 94 - 109 mmol/L    Carbon Dioxide (CO2) 23 20 - 32 mmol/L    Anion Gap 8 3 - 14 mmol/L    Urea Nitrogen 20 7 - 30 mg/dL    Creatinine 0.76 0.66 - 1.25 mg/dL    Calcium 9.0 8.5 - 10.1 mg/dL    Glucose 103 (H) 70 - 99  mg/dL    Alkaline Phosphatase 61 40 - 150 U/L    AST 27 0 - 45 U/L    ALT 45 0 - 70 U/L    Protein Total 7.4 6.8 - 8.8 g/dL    Albumin 4.0 3.4 - 5.0 g/dL    Bilirubin Total 0.4 0.2 - 1.3 mg/dL    GFR Estimate >90 >60 mL/min/1.73m2      Comment:      Effective December 21, 2021 eGFRcr in adults is calculated using the 2021 CKD-EPI creatinine equation which includes age and gender (Raeann et al., NEJM, DOI: 10.1056/VBXOyv0336803)       If you have any questions or concerns, please call the clinic at the number listed above.       Sincerely,      Karel Lancaster DO

## 2022-10-25 LAB — DEPRECATED CALCIDIOL+CALCIFEROL SERPL-MC: 24 UG/L (ref 20–75)

## 2023-02-14 ENCOUNTER — TELEPHONE (OUTPATIENT)
Dept: FAMILY MEDICINE | Facility: CLINIC | Age: 53
End: 2023-02-14
Payer: COMMERCIAL

## 2023-02-14 NOTE — TELEPHONE ENCOUNTER
Patient Quality Outreach    Patient is due for the following:   Colon Cancer Screening    Next Steps:   Schedule colonoscopy    Type of outreach:    Sent letter.      Questions for provider review:    None     Lynette Sebastian, CMA